# Patient Record
Sex: FEMALE | Race: WHITE | Employment: OTHER | URBAN - METROPOLITAN AREA
[De-identification: names, ages, dates, MRNs, and addresses within clinical notes are randomized per-mention and may not be internally consistent; named-entity substitution may affect disease eponyms.]

---

## 2017-09-19 ENCOUNTER — HOSPITAL ENCOUNTER (OUTPATIENT)
Dept: PHYSICAL THERAPY | Age: 42
Discharge: HOME | End: 2017-09-19
Payer: MEDICARE

## 2017-09-19 PROCEDURE — 97162 PT EVAL MOD COMPLEX 30 MIN: CPT

## 2017-09-19 PROCEDURE — 97110 THERAPEUTIC EXERCISES: CPT

## 2017-09-19 PROCEDURE — G8979 MOBILITY GOAL STATUS: HCPCS

## 2017-09-19 PROCEDURE — G8978 MOBILITY CURRENT STATUS: HCPCS

## 2017-09-19 NOTE — PROGRESS NOTES
PT DAILY TREATMENT NOTE/ EVAL 3-16    Patient Name: Jorge Arredondo  Date:2017  : 1975  [x]  Patient  Verified  [place]or: [place] MED[place] / Plan: [place] MED[place]ARE PART A & B / Product Type: Medicare /    In time:1245  Out time:1:40  Total Treatment Time (min): 55  Total Timed Codes (min): 0  1:1 Treatment Time ( [address] only): 54   Visit #: 1 of 10    Treatment Area: Muscular deconditioning [R29.898]    SUBJECTIVE  Pain Level (0-10 scale): 6/10 in bilat LE with standing; 4/10 global nerve pain - feels like rubber bands snapping    Any medication changes, allergies to medications, adverse drug reactions, diagnosis change, or new procedure performed?: [] No    [x] Yes (see summary sheet for update)  Subjective functional status/changes:     PLOF: Independent - requires extra time  Limitations to PLOF: decreased walking tolerance  Current symptoms/Complaints: global pain both neurological and muscle, decreased activity tolerance, balance associated with weak core  Previous Treatment/Compliance: Pt hospitalized for over 1 month during the end of May and July-Aug.   PMHx/Surgical Hx: Behcet's, Meniere's, Peripheral Neuropathy  Living Situation: Lives with parents  Pt Goals: Get stronger    OBJECTIVE/EXAMINATION    45 min [x]Eval                  []Re-Eval       10 min Therapeutic Exercise:  [x] See HEP in chart    Rationale: increase strength to improve the patients ability to perform ADLs. With   [x] TE   [] TA   [] neuro   [] other: Patient Education: [x] Review HEP    [] Progressed/Changed HEP based on:   [] positioning   [] body mechanics   [] transfers   [] heat/ice application    [] other:        Physical Therapy Evaluation     Posture: [] Poor    [x] Fair    [] Good    Describe:    Increased thoracic kyphosis, forward head, knees locked in hyperextension  Gait: [] Normal    [] Abnormal    Device:      Describe:    ROM:                            Shoulders:   AROM: WFL Bilat    PROM: WNL Bilat Knee:          AROM: WNL Bilat    PROM: WNL Bilat             Hip:              AROM: WFL Bilat                              PROM: WNL Bilat      Strength (MMT):  Shoulder L (1-5) R (1-5)   Shoulder Flexion 4 4   Shoulder Ext     Shoulder ABD 4 4   Shoulder ADD     Shoulder IR 4 4   Shoulder ER 3+ 3+                                             Hip L (1-5) R (1-5)   Hip Flexion 2+ 3-   Hip Ext     Hip ABD     Hip ADD     Hip ER     Hip IR       Knee L (1-5) R (1-5)   Knee Flexion 4 4+   Knee Extension 4+ 5   Ankle PF 4- 5   Ankle DF 4- 4+   Other       Tone: normal     Sensation: peripheral neuropathy    Reflexes: [x] Not Tested   Left Right   Biceps (C5)     Brachioradiais (C6)     Triceps (C7)     Knee Jerk (L4)     Ankle Jerk (SI)       Balance/ Equilibrium:        Standing Balance: Static:   [x] Good    [] Fair    [] Poor     Dynamic:   [] Good    [x] Fair-    [] Poor        Protective Extension:  [] Present    [] Delayed    [x] Absent        Single Leg Stance:         Eyes Open  Eyes Closed   L 1 L NT   R 5 R NT       Functional Mobility      Bed Mobility:         Sit-Supine: Uses UE to assist to sitting for supine; difficulty controlling descent with supine>sit      Transfers:       Sit-Stand: uses bilat UE to assist with standing and to control descent      Gait: WFL with 1 stumble with turn, mild path deviation to the L       Tandem: increased time some stumble, increased trunk sway, deviates to L          Cognition: [] One Step Commands   [x] Multiple Commands (however requires some repeating on occasion)   [] Displays Neglect [] R  [] L    Other test /comments: decreased eccentric strength to control descent with sitting, uses hands to assist with standing, difficulty with controlling upper body recline to supine, some pelvic floor weakness as noted by some incontinence, bridge ~ 20-25% indicating hip extensor weakness; some pain on L shoulder with end range flexion, uses UE to pull to sit from supine    When pt has Behcet's flare up - ulcers from head to toe, inflammation behind the eye, neuritis behind eye, joint and body pain, nausea, fatigue    Subjective motor control problems with movmeent for example trunk rotation. Pt persents with limited AROM. Coordination deficits such writing       Pain Level (0-10 scale) post treatment: 5/10    ASSESSMENT/Changes in Function: Pt is 40 yo female who presents to the clinic with significant deconditioning and loss of endurance secondary to prolonged hospitalization from illness. Pt also has both musculoskeletal pain associated with deconditioning as well as neurologic pain from peripheral neuropathy and residual affects for Behcet's disease. Pt presents with significant weakness affecting all extremities and core/hip resulting increased difficulty performing ADLs, functional transfers, and gait. Pt also has PMH significant for Meniere's disease, which combined with peripheral neuropathy and current deconditioning result in impaired balance such that pt has difficulty reaching forward or towards the ground. Patient will continue to benefit from skilled PT services to modify and progress therapeutic interventions, address functional mobility deficits, address strength deficits, analyze and cue movement patterns, analyze and modify body mechanics/ergonomics and assess and modify postural abnormalities to attain remaining goals.      [x]  See Plan of Care  []  See progress note/recertification  []  See Discharge Summary         Progress towards goals / Updated goals:  See plan of care    PLAN  []  Upgrade activities as tolerated     []  Continue plan of care  []  Update interventions per flow sheet       []  Discharge due to:_  []  Other:_      [facility], PT, DPT  9/19/2017

## 2017-09-20 NOTE — PROGRESS NOTES
[address] [facility] AT [facility]  [address] [place] [address],[address], Renetta Cade  Phone [phone]  Fax [phone] / [facility] PHYSICAL THERAP[facility] SERVICES  Patient Name: Luann Lopez : 1975   Medical   Diagnosis: Muscular deconditioning [R29.898] Treatment Diagnosis: Generalized Weakness/Deconditioning   Onset Date: Over the summer: End of May/ July-Aug     Referral Source: Sergio Krishna MD [facility]): 2017   Prior Hospitalization: See medical history Provider #: [id number]   Prior Level of Function: Independent   Comorbidities: Behcet's, Meniere's, Depression/Bi Polar, Hypothyroid, deat rt ear, Scoliosis   Medications: Verified on Patient Summary List   The Plan of Care and following information is based on the information from the initial evaluation.   =================================================================================  Assessment / key information:  Strength (MMT):  Shoulder L (1-5) R (1-5)   Shoulder Flexion 4 4   Shoulder Ext       Shoulder ABD 4 4   Shoulder ADD       Shoulder IR 4 4   Shoulder ER 3+ 3+                                             Hip L (1-5) R (1-5)   Hip Flexion 2+ 3-   Hip Ext       Hip ABD       Hip ADD       Hip ER       Hip IR          Knee L (1-5) R (1-5)   Knee Flexion 4 4+   Knee Extension 4+ 5   Ankle PF 4- 5   Ankle DF 4- 4+     Standing Balance: Static:             [x] Good    [] Fair    [] Poor                                              Dynamic:                   [] Good    [x] Fair-    [] Poor  Functional Mobility      Bed Mobility:                          Sit-Supine: Uses UE to assist to sitting for supine; difficulty controlling descent with supine>sit      Transfers:                        Sit-Stand: uses bilat UE to assist with standing and to control descent      Gait: WFL with 1 stumble with turn, mild path deviation to the L Tandem: increased time some stumble, increased trunk sway, deviates to L    Pt is 40 yo female who presents to the clinic with significant deconditioning and loss of endurance secondary to prolonged hospitalization from illness. Pt also has both musculoskeletal pain associated with deconditioning as well as neurologic pain from peripheral neuropathy and residual affects for Behcet's disease. Pt presents with significant weakness affecting all extremities and core/hip resulting increased difficulty performing ADLs, functional transfers, and gait.  Pt also has PMH significant for Meniere's disease, which combined with peripheral neuropathy and current deconditioning result in impaired balance such that pt has difficulty reaching forward or towards the ground.     =================================================================================  Eval Complexity: History: HIGH Complexity :3+ comorbidities / personal factors will impact the outcome/ POC Exam:HIGH Complexity : 4+ Standardized tests and measures addressing body structure, function, activity limitation and / or participation in recreation  Presentation: MEDIUM Complexity : Evolving with changing characteristics  Clinical Decision Making:MEDIUM Complexity : FOTO score of 26-74Overall Complexity:MEDIUM  Problem List: pain affecting function, decrease strength, impaired gait/ balance, decrease ADL/ functional abilitiies and decrease activity tolerance   Treatment Plan may include any combination of the following: Therapeutic exercise, Therapeutic activities, Neuromuscular re-education, Physical agent/modality, Gait/balance training, Manual therapy, Patient education and Functional mobility training  Patient / Family readiness to learn indicated by: asking questions and interest  Persons(s) to be included in education: patient (P)  Barriers to Learning/Limitations: None  Measures taken:     Patient Goal (s): Get stronger   Patient self reported health status: fair  Rehabilitation Potential: good   Short Term Goals: To be accomplished in  3  treatments:  1. Patient will be independent with HEP for strengthening and increased activity tolerance.  Long Term Goals: To be accomplished in  10  treatments:  2. Patient will increase strength to 5/5 to increase activity tolerance and ability to perform ADLs. 3.  Patient will improve dynamic standing balance to good to increase pt's ability to reach forward and down as needed for ADLs without fear of falling. 4. Patient will report FOTO score of 57/100 to indicate improved functional mobility. Frequency / Duration:   Patient to be seen  2-3  times per week for 10  treatments:  Patient / Caregiver education and instruction: exercises  G-Codes (GP): Mobility [id number] Current  CL= 60-79%   Goal  CK= 40-59%. The severity rating is based on the Level of Assistance required for Functional Mobility and ADLs. Therapist Signature: Jeramy Greene Date: 3/09/7414   Certification Period: 9/19/2017 - 12/18/2017 Time: 9:01 PM   ===========================================================================================  I certify that the above Physical Therapy Services are being furnished while the patient is under my care. I agree with the treatment plan and certify that this therapy is necessary. Physician Signature:        Date:       Time:     Please sign and return to [facility] or you may fax the signed copy to [phone]. Thank you.

## 2017-09-21 ENCOUNTER — APPOINTMENT (OUTPATIENT)
Dept: PHYSICAL THERAPY | Age: 42
End: 2017-09-21
Payer: MEDICARE

## 2017-09-25 ENCOUNTER — APPOINTMENT (OUTPATIENT)
Dept: PHYSICAL THERAPY | Age: 42
End: 2017-09-25
Payer: MEDICARE

## 2017-09-27 ENCOUNTER — APPOINTMENT (OUTPATIENT)
Dept: PHYSICAL THERAPY | Age: 42
End: 2017-09-27
Payer: MEDICARE

## 2017-10-03 ENCOUNTER — HOSPITAL ENCOUNTER (OUTPATIENT)
Dept: PHYSICAL THERAPY | Age: 42
Discharge: HOME | End: 2017-10-03
Payer: MEDICARE

## 2017-10-03 PROCEDURE — 97110 THERAPEUTIC EXERCISES: CPT

## 2017-10-03 NOTE — PROGRESS NOTES
PT DAILY TREATMENT NOTE     Patient Name: Radha Ross  Date:10/3/2017  : 1975  [x]  Patient  Verified  Payor: [place] MEDICARE / Plan: [place] MEDICARE PART A & B / Product Type: Medicare /    In time: 10:05  Out time:11:07  Total Treatment Time (min): 62  Visit #: 2 of     Treatment Area: Muscular deconditioning [R29.898]    SUBJECTIVE  Pain Level IN: (0-10 scale): 2/10   Pain Level OUT: (0-10 scale) post treatment: 2/10    Any medication changes, allergies to medications, adverse drug reactions, diagnosis change, or new procedure performed?: [x] No    [] Yes (see summary sheet for update)  Subjective functional status/changes:   [] No changes reported  I am doing okay, I know that I am weak all over from my shoulders to my back and in my legs    OBJECTIVE    Modality rationale: decrease pain, increase tissue extensibility and increase muscle contraction/control to improve the patients ability to perform normal ADLs without increase pain or assistance   Min Type Additional Details    [] Estim:  []Unatt       []IFC  []Premod                        []Other:  []w/ice   []w/heat  Position:  Location:    [] Estim: []Att    []TENS instruct  []NMES                    []Other:  []w/US   []w/ice   []w/heat  Position:  Location:    []  Traction: [] Cervical       []Lumbar                       [] Prone          []Supine                       []Intermittent   []Continuous Lbs:  [] before manual  [] after manual    []  Ultrasound: []Continuous   [] Pulsed                           []1MHz   []3MHz W/cm2:  Location:    []  Iontophoresis with dexamethasone         Location: [] Take home patch   [] In clinic    []  Ice     []  heat  []  Ice massage  []  Laser   []  Anodyne Position:  Location:    []  Laser with stim  []  Other:  Position:  Location:    []  Vasopneumatic Device Pressure:       [] lo [] med [] hi   Temperature: [] lo [] med [] hi   [] Skin assessment post-treatment:  []intact []redness- no adverse reaction    []redness  adverse reaction:       62/57 min Therapeutic Exercise:  [x] See flow sheet : first follow up visit since initial evaluation - initiated POC per flow sheet    Rationale: increase ROM, increase strength, improve coordination, improve balance and increase proprioception to improve the patients ability to achieve all goals stated below       With   [] TE   [] TA   [] neuro   [] other: Patient Education: [x] Review HEP    [] Progressed/Changed HEP based on:   [] positioning   [] body mechanics   [] transfers   [] heat/ice application    [] other:      Other Objective/Functional Measures: first follow up visit since initial evaluation - initiated POC per flow sheet        Patient was challenged with prone letter - required assistance with \"Y\". Patient demonstrated weakness with mod planks in prone, glut bridges and HS curls with SB - required assistance to perform exercises - patient was fatigued at end of therapy - however no increase of pain - demonstrated good willingness with program initiated today         ASSESSMENT/Changes in Function:     Patient will continue to benefit from skilled PT services to modify and progress therapeutic interventions, address functional mobility deficits, address ROM deficits, address strength deficits, analyze and cue movement patterns, analyze and modify body mechanics/ergonomics, assess and modify postural abnormalities and instruct in home and community integration to attain remaining goals. [x]  See Plan of Care  []  See progress note/recertification  []  See Discharge Summary         Progress towards goals / Updated goals: · Short Term Goals: To be accomplished in  3  treatments:  1. Patient will be independent with HEP for strengthening and increased activity tolerance. · Long Term Goals: To be accomplished in  10  treatments:  2. Patient will increase strength to 5/5 to increase activity tolerance and ability to perform ADLs.   3.  Patient will improve dynamic standing balance to good to increase pt's ability to reach forward and down as needed for ADLs without fear of falling. 4. Patient will report FOTO score of 57/100 to indicate improved functional mobility.     PLAN  [x]  Upgrade activities as tolerated     [x]  Continue plan of care  []  Update interventions per flow sheet       []  Discharge due to:_  []  Other:_      Sarah Beth Mercado PTA 10/3/2017  10:14 AM    Future Appointments  Date Time Provider Adriane Sánchez   10/6/2017 10:00 AM Sarah Beth Mercado PTA [facility]

## 2017-10-06 ENCOUNTER — APPOINTMENT (OUTPATIENT)
Dept: PHYSICAL THERAPY | Age: 42
End: 2017-10-06
Payer: MEDICARE

## 2017-10-09 ENCOUNTER — HOSPITAL ENCOUNTER (OUTPATIENT)
Dept: PHYSICAL THERAPY | Age: 42
Discharge: HOME | End: 2017-10-09
Payer: MEDICARE

## 2017-10-09 PROCEDURE — 97110 THERAPEUTIC EXERCISES: CPT

## 2017-10-09 NOTE — PROGRESS NOTES
PT DAILY TREATMENT NOTE     Patient Name: Flako Duncan  Date:10/9/2017  : 1975  [x]  Patient  Verified  Payor: [place] MEDICARE / Plan: [place] MEDICARE PART A & B / Product Type: Medicare /    In time: 8:04  Out time:8:50  Total Treatment Time (min): 55  Visit #: 3 of     Treatment Area: Muscular deconditioning [R29.898]    SUBJECTIVE  Pain Level IN: (0-10 scale): 3/10   Pain Level OUT: (0-10 scale) post treatment: 3/10    Any medication changes, allergies to medications, adverse drug reactions, diagnosis change, or new procedure performed?: [x] No    [] Yes (see summary sheet for update)  Subjective functional status/changes:   [] No changes reported  Having an autoimmune flare up and has some ulcers.      OBJECTIVE    Modality rationale: decrease pain, increase tissue extensibility and increase muscle contraction/control to improve the patients ability to perform normal ADLs without increase pain or assistance   Min Type Additional Details    [] Estim:  []Unatt       []IFC  []Premod                        []Other:  []w/ice   []w/heat  Position:  Location:    [] Estim: []Att    []TENS instruct  []NMES                    []Other:  []w/US   []w/ice   []w/heat  Position:  Location:    []  Traction: [] Cervical       []Lumbar                       [] Prone          []Supine                       []Intermittent   []Continuous Lbs:  [] before manual  [] after manual    []  Ultrasound: []Continuous   [] Pulsed                           []1MHz   []3MHz W/cm2:  Location:    []  Iontophoresis with dexamethasone         Location: [] Take home patch   [] In clinic    []  Ice     []  heat  []  Ice massage  []  Laser   []  Anodyne Position:  Location:    []  Laser with stim  []  Other:  Position:  Location:    []  Vasopneumatic Device Pressure:       [] lo [] med [] hi   Temperature: [] lo [] med [] hi   [] Skin assessment post-treatment:  []intact []redness- no adverse reaction    []redness  adverse reaction: 46/30 min Therapeutic Exercise:  [x] See flow sheet : first follow up visit since initial evaluation - initiated POC per flow sheet    Rationale: increase ROM, increase strength, improve coordination, improve balance and increase proprioception to improve the patients ability to achieve all goals stated below       With   [] TE   [] TA   [] neuro   [] other: Patient Education: [x] Review HEP    [] Progressed/Changed HEP based on:   [] positioning   [] body mechanics   [] transfers   [] heat/ice application    [] other:      Other Objective/Functional Measures: first follow up visit since initial evaluation - initiated POC per flow sheet        Challenged stabilizing L/S with bridges with OSB. Weakness noted in scapular ms and Teres & infraspinatus and LT and required assistance to perform Ys. Improved form and control with mini squats. ASSESSMENT/Changes in Function:     Patient will continue to benefit from skilled PT services to modify and progress therapeutic interventions, address functional mobility deficits, address ROM deficits, address strength deficits, analyze and cue movement patterns, analyze and modify body mechanics/ergonomics, assess and modify postural abnormalities and instruct in home and community integration to attain remaining goals. [x]  See Plan of Care  []  See progress note/recertification  []  See Discharge Summary         Progress towards goals / Updated goals: · Short Term Goals: To be accomplished in  3  treatments:  1. Patient will be independent with HEP for strengthening and increased activity tolerance. · Long Term Goals: To be accomplished in  10  treatments:  2. Patient will increase strength to 5/5 to increase activity tolerance and ability to perform ADLs. 3.  Patient will improve dynamic standing balance to good to increase pt's ability to reach forward and down as needed for ADLs without fear of falling.   4. Patient will report FOTO score of 57/100 to indicate improved functional mobility.     PLAN  [x]  Upgrade activities as tolerated     [x]  Continue plan of care  []  Update interventions per flow sheet       []  Discharge due to:_  []  Other:_      Sajan Frazieryvette 10/9/2017  10:14 AM    Future Appointments  Date Time Provider Adriane Sánchez   10/9/2017 8:00 AM [facility]   10/11/2017 7:30 AM Pascual Blankenship, PTA [facility]   10/16/2017 10:00 AM [facility] PT [facility]   10/18/2017 11:00 AM [facility] PT [facility]

## 2017-10-11 ENCOUNTER — APPOINTMENT (OUTPATIENT)
Dept: PHYSICAL THERAPY | Age: 42
End: 2017-10-11
Payer: MEDICARE

## 2017-10-13 ENCOUNTER — HOSPITAL ENCOUNTER (OUTPATIENT)
Dept: PHYSICAL THERAPY | Age: 42
End: 2017-10-13
Payer: MEDICARE

## 2017-10-16 ENCOUNTER — APPOINTMENT (OUTPATIENT)
Dept: PHYSICAL THERAPY | Age: 42
End: 2017-10-16
Payer: MEDICARE

## 2017-10-17 ENCOUNTER — APPOINTMENT (OUTPATIENT)
Dept: PHYSICAL THERAPY | Age: 42
End: 2017-10-17
Payer: MEDICARE

## 2017-10-18 ENCOUNTER — APPOINTMENT (OUTPATIENT)
Dept: PHYSICAL THERAPY | Age: 42
End: 2017-10-18
Payer: MEDICARE

## 2017-10-26 ENCOUNTER — HOSPITAL ENCOUNTER (OUTPATIENT)
Dept: PHYSICAL THERAPY | Age: 42
Discharge: HOME | End: 2017-10-26
Payer: MEDICARE

## 2017-10-26 PROCEDURE — G8978 MOBILITY CURRENT STATUS: HCPCS

## 2017-10-26 PROCEDURE — 97110 THERAPEUTIC EXERCISES: CPT

## 2017-10-26 PROCEDURE — G8980 MOBILITY D/C STATUS: HCPCS

## 2017-10-26 PROCEDURE — G8979 MOBILITY GOAL STATUS: HCPCS

## 2017-10-26 NOTE — PROGRESS NOTES
PT DAILY TREATMENT NOTE     Patient Name: Camille Duran  Date:10/26/2017  : 1975  [x]  Patient  Verified  Payor: Eleno Leon / Plan: [place] PART [place] & B / Product Type: Medicare /    In time: 2:33  Out time: 3:31  Total Treatment Time (min): 58  Total Timed Codes: 58  1:1 Treatment Time: 55  Visit #: 4 of     Treatment Area: Muscular deconditioning [R29.898]    SUBJECTIVE  Pain Level IN: (0-10 scale): 2/10 R Foot and back  Pain Level OUT: (0-10 scale) post treatment: 2/10    Any medication changes, allergies to medications, adverse drug reactions, diagnosis change, or new procedure performed?: [x] No    [] Yes (see summary sheet for update)  Subjective functional status/changes:   [] No changes reported  Pt reports back pain due to core weakness    OBJECTIVE    Modality rationale: decrease pain, increase tissue extensibility and increase muscle contraction/control to improve the patients ability to perform normal ADLs without increase pain or assistance   Min Type Additional Details    [] Estim:  []Unatt       []IFC  []Premod                        []Other:  []w/ice   []w/heat  Position:  Location:    [] Estim: []Att    []TENS instruct  []NMES                    []Other:  []w/US   []w/ice   []w/heat  Position:  Location:    []  Traction: [] Cervical       []Lumbar                       [] Prone          []Supine                       []Intermittent   []Continuous Lbs:  [] before manual  [] after manual    []  Ultrasound: []Continuous   [] Pulsed                           []1MHz   []3MHz W/cm2:  Location:    []  Iontophoresis with dexamethasone         Location: [] Take home patch   [] In clinic    []  Ice     []  heat  []  Ice massage  []  Laser   []  Anodyne Position:  Location:    []  Laser with stim  []  Other:  Position:  Location:    []  Vasopneumatic Device Pressure:       [] lo [] med [] hi   Temperature: [] lo [] med [] hi   [] Skin assessment post-treatment:  []intact []redness- no adverse reaction    []redness  adverse reaction:       58 (45 1:1) min Therapeutic Exercise:  [x] See flow sheet :    Rationale: increase ROM, increase strength, improve coordination, improve balance and increase proprioception to improve the patients ability to achieve all goals stated below       With   [] TE   [] TA   [] neuro   [] other: Patient Education: [x] Review HEP    [] Progressed/Changed HEP based on:   [] positioning   [] body mechanics   [] transfers   [] heat/ice application    [] other:      Other Objective/Functional Measures: FOTO: 34/100 (-2)  Pt is able to perform sit to stand without use of UE from 21.5 in surface  MMT: Knee Flex: bilat 5/5             Knee Ext: bilat 4+/5             Hip Ext:              Hip Flex (supine): bilat 4/5               Shoulder Flexion: bilat 4/5             Shoulder Abd: bilat 4/5             Periscapular strength: UT substitution with T's and unable to do Y's. SLS: R - 20 sec: knee turns medially (+15sec)           L - 12 sec: knee turns medially (+11 sec)    Pt reports overall improvement in leg strength. ASSESSMENT/Changes in Function: Pt demonstrates improvement in LE strength as noted above, however pt continues to present with core, bilat shoulder and periscapular musculature weakness. Pt demo's UT substitution when performing prone Ts. Pt unable to perform Ys. Pt reports the most difficulty getting up off the ground and standing long enough to get ready. Pt reports the most difficulty getting up off the ground and standing long enough to get ready. Pt would benefit from continued therapy to improve overall strength and endurance so as to improve balance and functional mobility.      Patient will continue to benefit from skilled PT services to modify and progress therapeutic interventions, address functional mobility deficits, address ROM deficits, address strength deficits, analyze and cue movement patterns, analyze and modify body mechanics/ergonomics, assess and modify postural abnormalities and instruct in home and community integration to attain remaining goals. [x]  See Plan of Care  []  See progress note/recertification  []  See Discharge Summary         Progress towards goals / Updated goals: · Short Term Goals: To be accomplished in  3  treatments:  1. Patient will be independent with HEP for strengthening and increased activity tolerance. MET 10/26/2017 - needs to be progressed in difficulty and to incorporate UE/Shoulders  · Long Term Goals: To be accomplished in  10  treatments:  2. Patient will increase strength to 5/5 to increase activity tolerance and ability to perform ADLs. Progressing 10/26/2017  3. Patient will improve dynamic standing balance to good to increase pt's ability to reach forward and down as needed for ADLs without fear of falling. Progressing 10/26/2017  4. Patient will report FOTO score of 57/100 to indicate improved functional mobility.  Progressing 10/26/2017 (34/100)    PLAN  [x]  Upgrade activities as tolerated     [x]  Continue plan of care  []  Update interventions per flow sheet       []  Discharge due to:_  []  Other:_      Jayme Santos, PT, DPT  10/26/2017

## 2017-10-26 NOTE — PROGRESS NOTES
[address] [facility] AT [facility]  [address] [place] [address],[address], [place], [place]  Phone [phone]  Fax [phone]  PROGRESS NOTE  Patient Name: Margie Alejandra : 1975   Treatment/Medical Diagnosis: Muscular deconditioning [R29.898]   Referral Source: Gemini Harrison MD     Date of Initial Visit: 2017 Attended Visits: 4 Missed Visits: 10     SUMMARY OF TREATMENT  Pt seen for total of 4 visits for deconditioning. Therapy consisted of therapeutic exercise to improve strength and balance. CURRENT STATUS  FOTO: 34/100 (-2)  Pt is able to perform sit to stand without use of UE from 21.5 in surface  MMT: Knee Flex: bilat 5/5             Knee Ext: bilat 4+/5             Hip Ext:              Hip Flex (supine): bilat 4/5               Shoulder Flexion: bilat 4/5             Shoulder Abd: bilat 4/5             Periscapular strength: UT substitution with T's and unable to do Y's. SLS: R - 20 sec: knee turns medially (+15sec)           L - 12 sec: knee turns medially (+11 sec)     Pt reports overall improvement in leg strength.      ASSESSMENT/Changes in Function: Pt demonstrates improvement in LE strength as noted above, however pt continues to present with core, bilat shoulder and periscapular musculature weakness. Pt demo's UT substitution when performing prone Ts. Pt unable to perform Ys. Pt reports the most difficulty getting up off the ground and standing long enough to get ready. Pt reports the most difficulty getting up off the ground and standing long enough to get ready. Pt would benefit from continued therapy to improve overall strength and endurance so as to improve balance and functional mobility. Goal/Measure of Progress Goal Met? · Short Term Goals: To be accomplished in  3  treatments:  1.  Patient will be independent with HEP for strengthening and increased activity tolerance. MET 10/26/2017 - needs to be progressed in difficulty and to incorporate UE/Shoulders  · Long Term Goals: To be accomplished in  10  treatments:  2.  Patient will increase strength to 5/5 to increase activity tolerance and ability to perform ADLs. Progressing 10/26/2017  3.  Patient will improve dynamic standing balance to good to increase pt's ability to reach forward and down as needed for ADLs without fear of falling. Progressing 10/26/2017    4. Patient will report FOTO score of 57/100 to indicate improved functional mobility. Progressing 10/26/2017 (34/100)  New Goals to be achieved in __8-12__  treatments:  Continue LTG's  G-Codes: Mobility  Current  CJ= 20-39%   Goal  CK= 40-59%. The severity rating is based on the Level of Assistance required for Functional Mobility and ADLs. RECOMMENDATIONS  Continue therapy 2x week for 8-12 treatment to continue working towards remaining goals. If you have any questions/comments please contact us directly at [phone]. Thank you for allowing us to assist in the care of your patient. Therapist Signature: Ariel Taylor Date: 10/26/2017     Time: 4:00 PM   NOTE TO PHYSICIAN:  PLEASE COMPLETE THE ORDERS BELOW AND FAX TO   [facility]: [phone]. If you are unable to process this request in 24 hours please contact our office: [phone].    ___ I have read the above report and request that my patient continue as recommended.   ___ I have read the above report and request that my patient continue therapy with the following changes/special instructions:_________________________________________________________   ___ I have read the above report and request that my patient be discharged from therapy.      Physician Signature:        Date:       Time:

## 2017-12-27 NOTE — PROGRESS NOTES
[address] [facility] AT [facility]  [address], [place] [address]  Phone [phone]  Fax [phone]  DISCHARGE SUMMARY FOR PHYSICAL THERAPY          Patient Name: José Cary : 1975   Treatment/Medical Diagnosis: Muscular deconditioning [R29.898]   Onset Date: Over the summer; End of May/Jul-Aug    Referral Source: Rodrigo Syed MD [facility]): 17   Prior Hospitalization: See Medical History Provider #: [id number]   Prior Level of Function: Independent   Comorbidities: Behcet's, Meniere's, Depression/Bi Polar, Hypothyroid, Deaf Rt. Ear, Scoliosis   Medications: Verified on Patient Summary List   Visits from [facility]: 4 Missed Visits: 9       Goal/Measure of Progress Goal Met? · Short Term Goals: To be accomplished in  3  treatments:  1.  Patient will be independent with HEP for strengthening and increased activity tolerance. MET 10/26/2017 - needs to be progressed in difficulty and to incorporate UE/Shoulders  · Long Term Goals: To be accomplished in  10  treatments:  2.  Patient will increase strength to 5/5 to increase activity tolerance and ability to perform ADLs. Progressing 10/26/2017  3.  Patient will improve dynamic standing balance to good to increase pt's ability to reach forward and down as needed for ADLs without fear of falling. Progressing 10/26/2017     4. Patient will report FOTO score of 57/100 to indicate improved functional mobility. Progressing 10/26/2017 (34/100)    Key Functional Changes/Progress: Pt did not return to clinic. G-Codes (GP): Mobility  [id number] Goal  CK= 40-59% [id number] D/C  CJ= 20-39%. The severity rating is based on the Level of Assistance required for Functional Mobility and ADLs. Assessments/Recommendations: Discontinue therapy due to lack of attendance or compliance. If you have any questions/comments please contact us directly at [phone].    Thank you for allowing us to assist in the care of your patient.     Therapist Signature: Winnie Garcia Date: 12/27/17   Reporting Period: 9/19/17-12/18/17 Time: 3:46 PM

## 2019-01-16 ENCOUNTER — HOSPITAL ENCOUNTER (OUTPATIENT)
Dept: PHYSICAL THERAPY | Age: 44
Discharge: HOME | End: 2019-01-16
Payer: MEDICARE

## 2019-01-16 PROCEDURE — 97162 PT EVAL MOD COMPLEX 30 MIN: CPT

## 2019-01-16 PROCEDURE — 97110 THERAPEUTIC EXERCISES: CPT

## 2019-01-16 NOTE — PROGRESS NOTES
[address] [facility] AT [address] [place] [address] [address], [place], [address] [place] [address] Road  Phone: [phone]   [phone] PLAN OF CARE / STATEMENT OF MEDICAL NECESSITY FOR PHYSICAL THERAPY SERVICES Patient Name: Vipin Smith : 1975 Medical  
Diagnosis: Neck pain [M54.2] Treatment Diagnosis: Neck pain [M54.2] Onset Date:  Referral Source: Theresa Padilla* Start of Care [facility]): 2019 Prior Hospitalization: See medical history Provider #: [id number] Prior Level of Function: Independent w/ ADL's Comorbidities: Behcet's disease, Medications: Verified on Patient Summary List  
The Plan of Care and following information is based on the information from the initial evaluation.  
=========================================================================================== Assessment / key information:  Patient is a 38 y/o female presenting for evaluation and treatment of the neck and upper extremities; onset beginning in approximately 2017. Recent MRI shows disc bulges in both cervical and lumbar region. Pt reports pain shooting intermittently into left upper extremity. She reports some weakness and difficulty with writing, holding/grasping things. She reports pain in the C-spine bilaterally, suboccipital region, down the left arm and toward the 4th-5th digits. Symptoms are worsened with looking up (neck), occasionally increased arm pain with running. Pt attends massage therapy periodically which she states has helped her neck. Pain intensity ranges from 0-7/10. POSTURE: Forward head, rounded shoulders AROM: WNL/hypermobility of C-spine and BL shoulders in all planes STRENGTH:  56 lbs left, 85 lbs right; Left upper extremity 4/5 throughout, 3+/5 left mid/low trap; 4-/5 on right PALPATION: Mod TTP throughout left>right suboccipitals, cervical paraspinals, UT/levator and rhomboid muscles SPECIAL TESTS: Joint hypermobility noted throughout both shoulders, wrists and spine, negative Spurling's and compression/distraction, negative Hyperabduction/TOS tests The patient was instructed in a home exercise program to address the above findings/deficits. The patient should benefit from therapy services to progress toward functional goals and improve quality of life. 
=========================================================================================== History MEDIUM  Complexity : 1-2 comorbidities / personal factors will impact the outcome/ POC ; Examination HIGH Complexity : 4+ Standardized tests and measures addressing body structure, function, activity limitation and / or participation in recreation ; Presentation HIGH Complexity : Unstable and unpredictable characteristics ; Decision Making MEDIUM Complexity : FOTO score of 26-74; Complexity MEDIUM Problem List: pain affecting function, decrease strength, decrease ADL/ functional abilitiies and decrease activity tolerance Treatment Plan may include any combination of the following: Therapeutic exercise, Therapeutic activities, Physical agent/modality, Manual therapy and Patient education Patient / Family readiness to learn indicated by: asking questions, trying to perform skills and interest 
Persons(s) to be included in education: patient (P) Barriers to Learning/Limitations: None Measures taken:   
Patient Goal (s): Reduce pain, improve strength in arm Patient self reported health status: fair Rehabilitation Potential: good ? Short Term Goals: To be accomplished in  6  treatments: Independent/compliant with long term HEP for cervical/postural stabilization Patient will be educated on sitting posture modification to reduce musculoskeletal strain with sitting ADL's Improve mid/low trap strengths to 4/5 to improve postural/scapular control with ADL's ? Long Term Goals: To be accomplished in  12  treatments: Improve FOTO outcome score by 8% to indicate a significant improvement in ADL function Improve dynamometer  strength to 65 lbs to improve daily motor tasks Frequency / Duration:   Patient to be seen  2  times per week for 6  weeks: 
Patient / Caregiver education and instruction: activity modification and exercises G-Codes (GP): ADAIR Therapist Signature: Corazon Taylor PT, OCS Date: 1/16/2019 Certification Period: 1/16/19-4/15/19 Time: 9:17 AM  
=========================================================================================== I certify that the above Physical Therapy Services are being furnished while the patient is under my care. I agree with the treatment plan and certify that this therapy is necessary. Physician Signature:       Date:      Time:  Please sign and return to [facility] or you may fax the signed copy to [phone]. Thank you.

## 2019-01-16 NOTE — PROGRESS NOTES
PT DAILY TREATMENT Patient Name: Jorge Arredondo Date:2019 : 1975 [x]  Patient  Verified Payor: [place] MEDICARE / Plan: [address] / Product Type: Medicare / In time:9:25  Out time:10:30 Total Treatment Time (min): 65 Total Timed Codes (min): 20 
1:1 Treatment Time ( only): 65 Visit #: 1 of 12 Treatment Area: Neck pain [M54.2] SUBJECTIVE Pain Level (0-10 scale): 3 See Plan of Care for subjective history and objective cervical/lumbar exam details OBJECTIVE Modality (rationale):  
[]  E-Stim: type _ x _ min     []att   []unatt   []w/US   []w/ice   []w/heat 
[]  Traction: []cerv   []pelvic   _ lbs x _ min     []pro   []sup   []int   []const 
[]  Ultrasound: []cont   []pulse    _ W/cm2 x _  min   []1MHz   []3MHz 
[]  Iontophoresis: []take home patch w/ dexamethazone    []40mA   []80mA 
                             []_ mA min w/: []dexamethazone   []other:_ 
[]  Ice pack _  min     [] Hot pack _  min     [] Paraffin _  min 
[]  Other:  
 
Therapeutic Exercise: [x] see flow sheet     [] Other:_ (minutes) : 20 Added/Changed Exercises: 
[x]  Added:See HEP to improve (function): ability to change and maintain head and shoulder positions []  Changed:_ to improve (function): Therapeutic Activity:      (minutes) : 
 
Manual Therapy:       (minutes) : 
 
Patient Education: [x] Review HEP    [] Progressed/Changed HEP based on:_  
[] positioning   [] body mechanics   [] transfers   [] heat/ice application   (minutes) : 
 
Pain Level (0-10 scale) post treatment: 3 
 
ASSESSMENT [x]  See Plan of Care Patient is assigned a medium evaluation complexity based upon presence of Behcet's disease, FOTO score, and complex/evolving symptom characteristics. PLANSee Plan of Care Adriane Castañeda 2019  9:22 AM

## 2019-01-22 ENCOUNTER — HOSPITAL ENCOUNTER (OUTPATIENT)
Dept: PHYSICAL THERAPY | Age: 44
Discharge: HOME | End: 2019-01-22
Payer: MEDICARE

## 2019-01-22 PROCEDURE — 97140 MANUAL THERAPY 1/> REGIONS: CPT

## 2019-01-22 PROCEDURE — 97110 THERAPEUTIC EXERCISES: CPT

## 2019-01-22 NOTE — PROGRESS NOTES
PT DAILY TREATMENT NOTE 8-14 Patient Name: Jovanna Falcon Date:2019 : 1975 [x]  Patient  Verified Payor: [place] MEDICARE / Plan: [address] / Product Type: Medicare / In time:9:10  Out time:10:36 Total Treatment Time (min): 86 Total Timed Codes (min):70 
1:1 Treatment Time (min): 70 Visit #: 2 of 12 Treatment Area: Neck pain [M54.2] SUBJECTIVE Pain Level (0-10 scale): 3.5/10 Any medication changes, allergies to medications, adverse drug reactions, diagnosis change, or new procedure performed?: [x] No    [] Yes (see summary sheet for update) Subjective functional status/changes:   [] No changes reported I feel a lot of tension in the left side of my neck and I feel the pain and numbness down my arm to my end 3 fingers pretty much all of the time. OBJECTIVE Modality rationale: decrease pain and increase tissue extensibility to improve the patients ability to improve functional abilities Type Additional Details  
[] Estim: []Att   []Unatt  []TENS instruct []IFC  []Premod []NMES                       []Other:  []w/US   []w/ice   []w/heat Position: Location:  
[]  Traction: [] Cervical       []Lumbar 
                     [] Prone          []Supine []Intermittent   []Continuous Lbs: 
[] before manual 
[] after manual  
[]  Ultrasound: []Continuous   [] Pulsed []1MHz   []3MHz Location: 
W/cm2:  
[]  Iontophoresis with dexamethasone Location: [] Take home patch  
[] In clinic  
[]  Ice     [x]  heat 
[]  Ice massage Position:seated Location:cervical x 10 mins []  Vasopneumatic Device Pressure: [] lo [] med [] hi  
Temp: [] lo [] med [] hi  
[] Skin assessment post-treatment:  []intact []redness- no adverse reaction 
     []redness  adverse reaction:  
 
 
64 min Therapeutic Exercise:  [x] See flow sheet :  
Rationale: increase ROM and increase strength to improve the patients ability to improve functional abilities 12 min Manual Therapy:  SOR, DTM and Instrument assisted soft tissue mobilization applied to left cervical musculature using GT 1,3&6 Rationale: increase ROM, increase tissue extensibility and decrease trigger points to improve functional mobility  
       
 min Patient Education: [x] Review HEP    [] Progressed/Changed HEP based on:  
[] positioning   [] body mechanics   [] transfers   [] heat/ice application Other Objective/Functional Measures:  
 
Pain Level (0-10 scale) post treatment: 2/10 ASSESSMENT/Changes in Function: Pt tolerated all new therex well upon trial today with chief c/o left cervical tension/pain with radicular symptoms down to digits 3-5. Pt responded well to SOR and manual intervention as well as neutral biased/elongation stretching due to combination of discogenic/stenotic based pathologies. Will continue to progress/advance patient within current POC as tolerated with monitoring symptoms. Patient will continue to benefit from skilled PT services to modify and progress therapeutic interventions, address functional mobility deficits, address ROM deficits, address strength deficits, analyze and address soft tissue restrictions, analyze and cue movement patterns, analyze and modify body mechanics/ergonomics and assess and modify postural abnormalities to attain remaining goals. []  See Plan of Care 
[]  See progress note/recertification 
[]  See Discharge Summary Progress towards goals / Updated goals: PLAN [x]  Upgrade activities as tolerated     []  Continue plan of care 
[]  Update interventions per flow sheet      
[]  Discharge due to:_ 
[]  Other:_ Michelle Moreland PTA 1/22/2019  9:12 AM

## 2019-01-24 ENCOUNTER — HOSPITAL ENCOUNTER (OUTPATIENT)
Dept: PHYSICAL THERAPY | Age: 44
Discharge: HOME | End: 2019-01-24
Payer: MEDICARE

## 2019-01-24 PROCEDURE — 97140 MANUAL THERAPY 1/> REGIONS: CPT

## 2019-01-24 PROCEDURE — 97110 THERAPEUTIC EXERCISES: CPT

## 2019-01-24 NOTE — PROGRESS NOTES
PT DAILY TREATMENT NOTE 8-14 Patient Name: Damian Robertson Date:2019 : 1975 [x]  Patient  Verified Payor: [place] MED[place] / Plan: [facility] / Product Type: Medicare / In time:10:01  Out time:11:28 Total Treatment Time (min): 87 Total Timed Codes (min): 71 
1:1 Treatment Time (min): 45 Visit #: 3 of 12 Treatment Area: Neck pain [M54.2] SUBJECTIVE Pain Level (0-10 scale): 3.5/10 Any medication changes, allergies to medications, adverse drug reactions, diagnosis change, or new procedure performed?: [x] No    [] Yes (see summary sheet for update) Subjective functional status/changes:   [] No changes reported My arm has still been numb and tingly especially when I use my arm to do certain movements as well as when I am driving. OBJECTIVE Modality rationale: decrease pain and increase tissue extensibility to improve the patients ability to improve functional abilities Min Type Additional Details  
 [] Estim: []Att   []Unatt  []TENS instruct []IFC  []Premod []NMES                       []Other:  []w/US   []w/ice   []w/heat Position: Location:  
 []  Traction: [] Cervical       []Lumbar 
                     [] Prone          []Supine []Intermittent   []Continuous Lbs: 
[] before manual 
[] after manual  
 []  Ultrasound: []Continuous   [] Pulsed []1MHz   []3MHz Location: 
W/cm2:  
 []  Iontophoresis with dexamethasone Location: [] Take home patch  
[] In clinic  
 []  Ice     [x]  heat 
[]  Ice massage Position:supine Location:cervical x 10 mins  []  Vasopneumatic Device Pressure: [] lo [] med [] hi  
Temp: [] lo [] med [] hi  
[] Skin assessment post-treatment:  []intact []redness- no adverse reaction 
     []redness  adverse reaction:  
 
 
62 min Therapeutic Exercise:  [x] See flow sheet :  
Rationale: increase ROM and increase strength to improve the patients ability to improve functional abilities 15 min Manual Therapy:  Instrument assisted soft tissue mobilization applied to suboccipitals and bilateral cervical musculature using GT 1,3&6 Rationale: increase ROM, increase tissue extensibility and decrease trigger points to improve functional mobility  
       
 min Patient Education: [x] Review HEP    [] Progressed/Changed HEP based on:  
[] positioning   [] body mechanics   [] transfers   [] heat/ice application Other Objective/Functional Measures:  
 
Pain Level (0-10 scale) post treatment: 2/10 ASSESSMENT/Changes in Function: Pt reported increased benefit from focusing manual intervention to suboccipital and bilateral cervical musculature today. Pt still reports intermittent left UE radicular numbness/tingling with various UE AROM activities and prolonged driving. Will continue to progress/advance patient within current POC as tolerated with monitoring symptoms. Patient will continue to benefit from skilled PT services to modify and progress therapeutic interventions, address functional mobility deficits, address ROM deficits, address strength deficits, analyze and address soft tissue restrictions, analyze and cue movement patterns, analyze and modify body mechanics/ergonomics and assess and modify postural abnormalities to attain remaining goals. []  See Plan of Care 
[]  See progress note/recertification 
[]  See Discharge Summary Progress towards goals / Updated goals: PLAN [x]  Upgrade activities as tolerated     []  Continue plan of care 
[]  Update interventions per flow sheet      
[]  Discharge due to:_ 
[]  Other:_ Jeremy Stephenson, ALISTAIR 1/24/2019  10:09 AM

## 2019-01-30 ENCOUNTER — HOSPITAL ENCOUNTER (OUTPATIENT)
Dept: PHYSICAL THERAPY | Age: 44
Discharge: HOME | End: 2019-01-30
Payer: MEDICARE

## 2019-01-30 PROCEDURE — 97110 THERAPEUTIC EXERCISES: CPT

## 2019-01-30 PROCEDURE — 97140 MANUAL THERAPY 1/> REGIONS: CPT

## 2019-01-30 NOTE — PROGRESS NOTES
PT DAILY TREATMENT NOTE 8-14 Patient Name: Alec Shelton Date:2019 : 1975 [x]  Patient  Verified Payor: [place] MEDICARE / Plan: Jodie Charles y / Product Type: Medicare / In time:1:28  Out time:2:35 Total Treatment Time (min): 67 Total Timed Codes (min): 57 
1:1 Treatment Time (min): 57 Visit #: 4 of 12 Treatment Area: Neck pain [M54.2] SUBJECTIVE Pain Level (0-10 scale): 5 Any medication changes, allergies to medications, adverse drug reactions, diagnosis change, or new procedure performed?: [x] No    [] Yes (see summary sheet for update) Subjective functional status/changes:   [] No changes reported Pt reports increased central neck pain, especially with looking up. OBJECTIVE Modality rationale: decrease pain and increase tissue extensibility to improve the patients ability to change and maintain head and shoulder positions Type Additional Details  
[] Estim: []Att   []Unatt  []TENS instruct []IFC  []Premod []NMES                       []Other:  []w/US   []w/ice   []w/heat Position: Location:  
[]  Traction: [] Cervical       []Lumbar 
                     [] Prone          []Supine []Intermittent   []Continuous Lbs: 
[] before manual 
[] after manual  
[]  Ultrasound: []Continuous   [] Pulsed []1MHz   []3MHz Location: 
W/cm2:  
[]  Iontophoresis with dexamethasone Location: [] Take home patch  
[] In clinic  
[]  Ice     [x]  heat 
[]  Ice massage Position: Seated Location: Cervical  
[]  Vasopneumatic Device Pressure: [] lo [] med [] hi  
Temp: [] lo [] med [] hi  
[] Skin assessment post-treatment:  []intact []redness- no adverse reaction 
     []redness  adverse reaction:  
 
 
43 min Therapeutic Exercise:  [] See flow sheet :  
Rationale: increase ROM and increase strength to improve the patients ability to change and maintain head and shoulder positions 14 min Manual Therapy:  Instrument assisted soft tissue mobilization applied to left>right cervical paraspinals, UT and levator  using GT-3 and GT-4 Rationale: increase tissue extensibility to improve the patient's ability to change and maintain head and shoulder positions Pain Level (0-10 scale) post treatment: 4 
 
ASSESSMENT/Changes in Function: Patient was progressed with prone scap exercises to addition of \"T\" lifts for mid trap strengthening. Pt required cueing as with onset of fatigue there was  Gradual/noticeable drop in arm angle. Patient will continue to benefit from skilled PT services to address strength deficits, analyze and address soft tissue restrictions and analyze and cue movement patterns to attain remaining goals. []PLAN [x]  Upgrade activities as tolerated     []  Continue plan of care 
[]  Update interventions per flow sheet      
[]  Discharge due to:_ 
[]  Other:_ Sonia Rojas PT 1/30/2019  8:16 AM

## 2019-02-01 ENCOUNTER — HOSPITAL ENCOUNTER (OUTPATIENT)
Dept: PHYSICAL THERAPY | Age: 44
Discharge: HOME | End: 2019-02-01
Payer: MEDICARE

## 2019-02-01 PROCEDURE — 97140 MANUAL THERAPY 1/> REGIONS: CPT

## 2019-02-01 PROCEDURE — 97110 THERAPEUTIC EXERCISES: CPT

## 2019-02-01 NOTE — PROGRESS NOTES
PT DAILY TREATMENT NOTE 8 Patient Name: Ja Nye Date:2019 : 1975 [x]  Patient  Verified Payor: [place] MEDICARE / Plan: Jodie Charles [facility] / Product Type: Medicare / In time:7:00  Out time:8:26 Total Treatment Time (min): 86 Total Timed Codes (min): 70 
1:1 Treatment Time (min): 70 Visit #: 5 of 12 Treatment Area: Neck pain [M54.2] SUBJECTIVE Pain Level (0-10 scale): 4/10 Any medication changes, allergies to medications, adverse drug reactions, diagnosis change, or new procedure performed?: [x] No    [] Yes (see summary sheet for update) Subjective functional status/changes:   [] No changes reported My neck usually hurts in the morning the most and it wakes me up sometimes during the night, but I usually try to sleep on my back and end up on my side sometimes. OBJECTIVE Modality rationale: decrease pain and increase tissue extensibility to improve the patients ability to improve functional abilities Type Additional Details  
[] Estim: []Att   []Unatt  []TENS instruct []IFC  []Premod []NMES                       []Other:  []w/US   []w/ice   []w/heat Position: Location:  
[]  Traction: [] Cervical       []Lumbar 
                     [] Prone          []Supine []Intermittent   []Continuous Lbs: 
[] before manual 
[] after manual  
[]  Ultrasound: []Continuous   [] Pulsed []1MHz   []3MHz Location: 
W/cm2:  
[]  Iontophoresis with dexamethasone Location: [] Take home patch  
[] In clinic  
[]  Ice     [x]  heat 
[]  Ice massage Position:seated Location:cervical x 10 minutes  
[]  Vasopneumatic Device Pressure: [] lo [] med [] hi  
Temp: [] lo [] med [] hi  
[] Skin assessment post-treatment:  []intact []redness- no adverse reaction 
     []redness  adverse reaction:  
 
 
61 min Therapeutic Exercise:  [x] See flow sheet :  
 Rationale: increase ROM and increase strength to improve the patients ability to improve functional abilities 15 min Manual Therapy:  Instrument assisted soft tissue mobilization applied to suboccipitals and bilateral cervical musculature using GT 1,3&4 including pin and glides to right upper trap Rationale: increase ROM, increase tissue extensibility and decrease trigger points to improve functional mobility  
       
 min Patient Education: [x] Review HEP    [] Progressed/Changed HEP based on:  
[] positioning   [] body mechanics   [] transfers   [] heat/ice application Other Objective/Functional Measures:  
 
Pain Level (0-10 scale) post treatment: 3/10 ASSESSMENT/Changes in Function: Pt was able to advance to addition of theraband lower trap walkouts as well as increasing to 1 lb with prone I PRE's with min to mod challenge today. Pt also reported increased benefit from trial with pin and glides to right upper trap with manual intervention today. Will continue to progress/advance patient within current POC as tolerated with monitoring symptoms. Patient will continue to benefit from skilled PT services to modify and progress therapeutic interventions, address functional mobility deficits, address ROM deficits, address strength deficits, analyze and address soft tissue restrictions, analyze and cue movement patterns, analyze and modify body mechanics/ergonomics and assess and modify postural abnormalities to attain remaining goals. []  See Plan of Care 
[]  See progress note/recertification 
[]  See Discharge Summary Progress towards goals / Updated goals: STG #2=Patient will be educated on sitting posture modification to reduce musculoskeletal strain with sitting ADL's=Met PLAN [x]  Upgrade activities as tolerated     []  Continue plan of care 
[]  Update interventions per flow sheet      
[]  Discharge due to:_ 
[]  Other:_ Gildardo De La Rosa PTA 2/1/2019  6:59 AM

## 2019-02-05 ENCOUNTER — HOSPITAL ENCOUNTER (OUTPATIENT)
Dept: PHYSICAL THERAPY | Age: 44
Discharge: HOME | End: 2019-02-05
Payer: MEDICARE

## 2019-02-05 PROCEDURE — 97110 THERAPEUTIC EXERCISES: CPT

## 2019-02-05 PROCEDURE — 97140 MANUAL THERAPY 1/> REGIONS: CPT

## 2019-02-05 NOTE — PROGRESS NOTES
PT DAILY TREATMENT NOTE 8-14 Patient Name: Damian Robertson Date:2019 : 1975 [x]  Patient  Verified Payor: [place] MEDICARE / Plan: Jodie Charles [facility] / Product Type: Medicare / In time:9:18  Out time:10:37 Total Treatment Time (min): 79 Total Timed Codes (min): 63 
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 OBJECTIVE Modality rationale: decrease pain and increase tissue extensibility to improve the patients ability to improve functional abilities Type Additional Details  
[] Estim: []Att   []Unatt  []TENS instruct []IFC  []Premod []NMES                       []Other:  []w/US   []w/ice   []w/heat Position: Location:  
[]  Traction: [] Cervical       []Lumbar 
                     [] Prone          []Supine []Intermittent   []Continuous Lbs: 
[] before manual 
[] after manual  
[]  Ultrasound: []Continuous   [] Pulsed []1MHz   []3MHz Location: 
W/cm2:  
[]  Iontophoresis with dexamethasone Location: [] Take home patch  
[] In clinic  
[]  Ice     [x]  heat 
[]  Ice massage Position:seated Location:cervical x 10 minutes  
[]  Vasopneumatic Device Pressure: [] lo [] med [] hi  
Temp: [] lo [] med [] hi  
[] Skin assessment post-treatment:  []intact []redness- no adverse reaction 
     []redness  adverse reaction:  
 
 
54 min Therapeutic Exercise:  [x] See flow sheet :  
Rationale: increase ROM and increase strength to improve the patients ability to improve functional abilities 15 min Manual Therapy:  Instrument assisted soft tissue mobilization applied to suboccipitals and bilateral cervical musculature using GT 1&3 Rationale: increase ROM, increase tissue extensibility and decrease trigger points to improve functional mobility  
       
 min Patient Education: [x] Review HEP    [] Progressed/Changed HEP based on:  
[] positioning   [] body mechanics   [] transfers   [] heat/ice application Other Objective/Functional Measures:  
 
Pain Level (0-10 scale) post treatment: 3/10 ASSESSMENT/Changes in Function: Pt presented with chief c/o increased cervical symptoms from prolonged reaching with performing artwork as well as doing a yoga class since last tx, but was able to tolerate full normal  therex regiment with min to mod challenge today. Pt was also able to tolerate increased resistance from 1 to 2 lbs with cervicothoracic stabs as well as addition of 1 lb with prone stability ball scapular stabs with moderate challenge today. Will continue to progress/advance patient within current POC as tolerated with monitoring symptoms. Patient will continue to benefit from skilled PT services to modify and progress therapeutic interventions, address functional mobility deficits, address ROM deficits, address strength deficits, analyze and address soft tissue restrictions, analyze and cue movement patterns, analyze and modify body mechanics/ergonomics and assess and modify postural abnormalities to attain remaining goals. []  See Plan of Care 
[]  See progress note/recertification 
[]  See Discharge Summary Progress towards goals / Updated goals: PLAN [x]  Upgrade activities as tolerated     []  Continue plan of care 
[]  Update interventions per flow sheet      
[]  Discharge due to:_ 
[]  Other:_ Angelica Cummins PTA 2/5/2019  9:40 AM

## 2019-02-08 ENCOUNTER — HOSPITAL ENCOUNTER (OUTPATIENT)
Dept: PHYSICAL THERAPY | Age: 44
Discharge: HOME | End: 2019-02-08
Payer: MEDICARE

## 2019-02-08 PROCEDURE — 97110 THERAPEUTIC EXERCISES: CPT

## 2019-02-08 PROCEDURE — 97140 MANUAL THERAPY 1/> REGIONS: CPT

## 2019-02-08 NOTE — PROGRESS NOTES
PT DAILY TREATMENT NOTE  Patient Name: Stefani Garcia Date:2019 : 1975 [x]  Patient  Verified Payor: [place] MEDICARE / Plan: Jodie Charles y / Product Type: Medicare / In time:9:19  Out time:10:31 Total Treatment Time (min): 72 Total Timed Codes (min): 56 
1:1 Treatment Time (min): 45 Visit #: 7 of 12 Treatment Area: Neck pain [M54.2] SUBJECTIVE Pain Level (0-10 scale): 3/10 Any medication changes, allergies to medications, adverse drug reactions, diagnosis change, or new procedure performed?: [x] No    [] Yes (see summary sheet for update) Subjective functional status/changes:   [] No changes reported My neck and shoulders felt really good for the rest of the day after I was here last time, I don't know what it was but it was my best day since I started the therapy. OBJECTIVE Modality rationale: decrease pain and increase tissue extensibility to improve the patients ability to improve functional abilities Min Type Additional Details  
 [] Estim: []Att   []Unatt  []TENS instruct []IFC  []Premod []NMES                       []Other:  []w/US   []w/ice   []w/heat Position: Location:  
 []  Traction: [] Cervical       []Lumbar 
                     [] Prone          []Supine []Intermittent   []Continuous Lbs: 
[] before manual 
[] after manual  
 []  Ultrasound: []Continuous   [] Pulsed []1MHz   []3MHz Location: 
W/cm2:  
 []  Iontophoresis with dexamethasone Location: [] Take home patch  
[] In clinic  
 []  Ice     [x]  heat 
[]  Ice massage Position:seated Location:cervical x 10 minutes  
 []  Vasopneumatic Device Pressure: [] lo [] med [] hi  
Temp: [] lo [] med [] hi  
[] Skin assessment post-treatment:  []intact []redness- no adverse reaction 
     []redness  adverse reaction:  
 
 
47 min Therapeutic Exercise:  [x] See flow sheet :  
 Rationale: increase ROM and increase strength to improve the patients ability to improve abilities 15 min Manual Therapy:  Instrument assisted soft tissue mobilization applied to suboccipitals and bilateral cervical musculature using GT 1&3 Rationale: increase ROM, increase tissue extensibility and decrease trigger points to improve functional mobility  
       
 min Patient Education: [x] Review HEP    [] Progressed/Changed HEP based on:  
[] positioning   [] body mechanics   [] transfers   [] heat/ice application Other Objective/Functional Measures:  
 
Pain Level (0-10 scale) post treatment: 2/10 ASSESSMENT/Changes in Function: Pt presented with the most functional reported improvement since initial evaluation with decreased intensity of cervical pain/symptoms for several hours after last treatment. Will continue to progress/advance patient within current POC as tolerated with monitoring symptoms. Patient will continue to benefit from skilled PT services to modify and progress therapeutic interventions, address functional mobility deficits, address ROM deficits, address strength deficits, analyze and address soft tissue restrictions, analyze and cue movement patterns, analyze and modify body mechanics/ergonomics and assess and modify postural abnormalities to attain remaining goals. []  See Plan of Care 
[]  See progress note/recertification 
[]  See Discharge Summary Progress towards goals / Updated goals: PLAN [x]  Upgrade activities as tolerated     []  Continue plan of care 
[]  Update interventions per flow sheet      
[]  Discharge due to:_ 
[]  Other:_ Coleman Engle PTA 2/8/2019  9:20 AM

## 2019-02-12 ENCOUNTER — HOSPITAL ENCOUNTER (OUTPATIENT)
Dept: PHYSICAL THERAPY | Age: 44
Discharge: HOME | End: 2019-02-12
Payer: MEDICARE

## 2019-02-12 PROCEDURE — 97110 THERAPEUTIC EXERCISES: CPT

## 2019-02-12 PROCEDURE — 97140 MANUAL THERAPY 1/> REGIONS: CPT

## 2019-02-12 NOTE — PROGRESS NOTES
[address] [facility] [address] 
[facility] [address], [place], [address]  Phone: [phone]   [phone] PROGRESS NOTE Patient Name: Vipin Smith : 1975 Treatment/Medical Diagnosis: Neck pain [M54.2] Referral Source: Theresa Padilla* Date of Initial Visit: 19 Attended Visits: 8 Missed Visits: 0  
SUMMARY OF TREATMENT Therapeutic exercise for cervical mobility, postural awareness/strengthening, cervicothoracic stabilization, manual intervention, moist heat and HEP. CURRENT STATUS Patient reports approximately 50% overall improvement from therapy since initial evaluation with 3-4/10 pain level on average, increased to 5/10 at the worst with increased forward flexion and reaching type ADL's especially overhead > 40 minutes. Pt is making slow but steady progress with gaining cervical mobility as well as advancing with cervical/UE strengthening with current POC. Pt would benefit from continued therapy for 8 additional visits to achieve maximum medical benefit/potential from current POC. Will continue to progress/advance patient within current POC as tolerated with monitoring symptoms. Cervical AROM= Flexion= 52 degrees, Extension=72 degrees; Side bending= right=52 degrees, left=55 degrees; Rotation= right=88 degrees, left=84 degrees Scapular strength measurements/MMT= Mid traps= right=3+/5, left=4-/5; Lower traps= right=3/5, left=4-/5  strength= right= 67 lbs, left=47 lbs Goal/Measure of Progress Goal Met? 1. Patient will be educated on sitting posture modification to reduce musculoskeletal strain with sitting ADL's  
Status at last Eval: Progressing  Current Status: Met yes 2. Improve mid/low trap strengths to 4/5 to improve postural/scapular control with ADL's  
Status at last Eval: Progressing  Current Status: Not Met no 3.   Improve FOTO outcome score by 8% to indicate a significant improvement in ADL function Status at last Eval: 46/100 Current Status: 61/100 yes 4. Improve dynamometer  strength to 65 lbs to improve daily motor tasks Status at last Eval: Progressing  Current Status: Partially Met Partially Met New Goals to be achieved in __8__  treatments: 
1. Improve mid/low trap strengths to 4/5 to improve postural/scapular control with ADL's 2. Improve dynamometer left  strength to 65 lbs to improve daily motor tasks 3. Pt will report =/> 75% overall improvement since initial evaluation to indicate increased functional improvement from current POC. 4.  Pt will be able to perform increased forward flexion and reaching type ADL's including in overhead planes for > 1 hour without increasing cervical pain >3/10 for increased functional abilities with ADL. RECOMMENDATIONS Continue with current POC for 8 additional visits with advancing as tolerated, then reassess for the need for continuation or discharge from therapy. If you have any questions/comments please contact us directly at [phone]. Thank you for allowing us to assist in the care of your patient. LPTA Signature: Ashleigh Ruiz PTA  Date: 2/12/2019 PT Signature: DEX Cummings, [facility] Time: 9:47 AM  
NOTE TO PHYSICIAN:  PLEASE COMPLETE THE ORDERS BELOW AND FAX TO [facility] at [facility] UNIT: [phone]. If you are unable to process this request in 24 hours please contact our office: [phone]. 
 
___ I have read the above report and request that my patient continue as recommended.  
___ I have read the above report and request that my patient continue therapy with the following changes/special instructions:_________________________________________________________  
___ I have read the above report and request that my patient be discharged from therapy.   
 
Physician Signature:       Date:      Time:

## 2019-02-12 NOTE — PROGRESS NOTES
PT DAILY TREATMENT NOTE 8-14 Patient Name: Washington Cottrell Date:2019 : 1975 [x]  Patient  Verified Payor: [place] [employer] / Plan: Jodie Charles y / Product Type: Medicare / In time:9:13  Out time:10:37 Total Treatment Time (min): 84 Total Timed Codes (min): 68 
1:1 Treatment Time (min): 45 Visit #: 8 of 12 Treatment Area: Neck pain [M54.2] SUBJECTIVE Pain Level (0-10 scale): 2-3/10 Any medication changes, allergies to medications, adverse drug reactions, diagnosis change, or new procedure performed?: [x] No    [] Yes (see summary sheet for update) Subjective functional status/changes:   [] No changes reported My neck has actually been feeling pretty good since I was here last, no real flare ups or new pain over the past few days. OBJECTIVE Modality rationale: decrease pain and increase tissue extensibility to improve the patients ability to improve functional abilities Type Additional Details  
[] Estim: []Att   []Unatt  []TENS instruct []IFC  []Premod []NMES                       []Other:  []w/US   []w/ice   []w/heat Position: Location:  
[]  Traction: [] Cervical       []Lumbar 
                     [] Prone          []Supine []Intermittent   []Continuous Lbs: 
[] before manual 
[] after manual  
[]  Ultrasound: []Continuous   [] Pulsed []1MHz   []3MHz Location: 
W/cm2:  
[]  Iontophoresis with dexamethasone Location: [] Take home patch  
[] In clinic  
[]  Ice     [x]  heat 
[]  Ice massage Position:seated Location:cervical x 10 minutes  
[]  Vasopneumatic Device Pressure: [] lo [] med [] hi  
Temp: [] lo [] med [] hi  
[] Skin assessment post-treatment:  []intact []redness- no adverse reaction 
     []redness  adverse reaction:  
 
 
59 min Therapeutic Exercise:  [x] See flow sheet :  
Rationale: increase ROM and increase strength to improve the patients ability to improve functional abilities 15 min Manual Therapy:   Instrument assisted soft tissue mobilization applied to suboccipitals and bilateral cervical musculature using GT 1&3   
Rationale: decrease pain, increase ROM, increase tissue extensibility and decrease trigger points to improve functional mobility 
    
 min Patient Education: [x] Review HEP    [] Progressed/Changed HEP based on:  
[] positioning   [] body mechanics   [] transfers   [] heat/ice application Other Objective/Functional Measures:  
 
Pain Level (0-10 scale) post treatment: 5/10 ASSESSMENT/Changes in Function:  
 
Patient will continue to benefit from skilled PT services to modify and progress therapeutic interventions, address functional mobility deficits, address ROM deficits, address strength deficits, analyze and address soft tissue restrictions, analyze and cue movement patterns, analyze and modify body mechanics/ergonomics and assess and modify postural abnormalities to attain remaining goals. []  See Plan of Care [x]  See progress note/recertification 
[]  See Discharge Summary Progress towards goals / Updated goals: 
See Progress note/Physician update for full detailed progress towards established goals. PLAN [x]  Upgrade activities as tolerated     []  Continue plan of care 
[]  Update interventions per flow sheet      
[]  Discharge due to:_ 
[]  Other:_ Philippe Mckeon PTA 2/12/2019  9:26 AM 
 
Future Appointments Date Time Provider Adriane Sánchez 2/15/2019  9:15 AM Bishop Jovita PTA [facility]  
2/19/2019  9:15 AM Bishop Jovita PTA [facility]  
2/22/2019  9:15 AM Bishop Jovita PTA [facility]

## 2019-02-15 ENCOUNTER — HOSPITAL ENCOUNTER (OUTPATIENT)
Dept: PHYSICAL THERAPY | Age: 44
Discharge: HOME | End: 2019-02-15
Payer: MEDICARE

## 2019-02-15 PROCEDURE — 97140 MANUAL THERAPY 1/> REGIONS: CPT

## 2019-02-15 PROCEDURE — 97110 THERAPEUTIC EXERCISES: CPT

## 2019-02-15 NOTE — PROGRESS NOTES
PT DAILY TREATMENT NOTE 8 Patient Name: Damian Robertson Date:2/15/2019 : 1975 [x]  Patient  Verified Payor: [place] MED[place] / [facility] Product Type: Medicare / In time:9:16  Out time:10:32 Total Treatment Time (min): 76 Total Timed Codes (min): 60 
1:1 Treatment Time (min): 45 Visit #: 9 of 16 Treatment Area: Neck pain [M54.2] SUBJECTIVE Pain Level (0-10 scale): 5/10 Any medication changes, allergies to medications, adverse drug reactions, diagnosis change, or new procedure performed?: [x] No    [] Yes (see summary sheet for update) Subjective functional status/changes:   [] No changes reported I am still feeling the pain more in the right side of my neck like it was when it started the last time that I was here, but if I do the chin tucks the goes down either one of my arms randomly. OBJECTIVE Modality rationale: decrease pain and increase tissue extensibility to improve the patients ability to improve functional abilities Type Additional Details  
[] Estim: []Att   []Unatt  []TENS instruct []IFC  []Premod []NMES                       []Other:  []w/US   []w/ice   []w/heat Position: Location:  
[]  Traction: [] Cervical       []Lumbar 
                     [] Prone          []Supine []Intermittent   []Continuous Lbs: 
[] before manual 
[] after manual  
[]  Ultrasound: []Continuous   [] Pulsed []1MHz   []3MHz Location: 
W/cm2:  
[]  Iontophoresis with dexamethasone Location: [] Take home patch  
[] In clinic  
[]  Ice     [x]  heat 
[]  Ice massage Position:seated Location:cervical x 10 minutes  
[]  Vasopneumatic Device Pressure: [] lo [] med [] hi  
Temp: [] lo [] med [] hi  
[] Skin assessment post-treatment:  []intact []redness- no adverse reaction 
     []redness  adverse reaction:  
 
 
51 min Therapeutic Exercise:  [x] See flow sheet :  
 Rationale: increase ROM and increase strength to improve the patients ability to improve functional abilities 15 min Manual Therapy:  Instrument assisted soft tissue mobilization applied to suboccipitals and bilateral cervical musculature using GT 1&3   
Rationale: increase ROM, increase tissue extensibility and decrease trigger points to improve functional mobility  
       
 min Patient Education: [x] Review HEP    [] Progressed/Changed HEP based on:  
[] positioning   [] body mechanics   [] transfers   [] heat/ice application Other Objective/Functional Measures:  
 
Pain Level (0-10 scale) post treatment: 4/10 ASSESSMENT/Changes in Function: Pt presented with chief c/o increased intermittently UE radicular symptoms that switches sides randomly since last treatment. Pt presented with most palpable tension in right proximal lateral cervical musculature > upper trap with manual intervention today. Will continue to progress/advance patient within current POC as tolerated with monitoring symptoms. Patient will continue to benefit from skilled PT services to modify and progress therapeutic interventions, address functional mobility deficits, address ROM deficits, address strength deficits, analyze and address soft tissue restrictions, analyze and cue movement patterns, analyze and modify body mechanics/ergonomics and assess and modify postural abnormalities to attain remaining goals. []  See Plan of Care 
[]  See progress note/recertification 
[]  See Discharge Summary Progress towards goals / Updated goals: PLAN [x]  Upgrade activities as tolerated     []  Continue plan of care 
[]  Update interventions per flow sheet      
[]  Discharge due to:_ 
[]  Other:_ Eda Todd PTA 2/15/2019  9:13 AM

## 2019-02-19 ENCOUNTER — HOSPITAL ENCOUNTER (OUTPATIENT)
Dept: PHYSICAL THERAPY | Age: 44
Discharge: HOME | End: 2019-02-19
Payer: MEDICARE

## 2019-02-19 PROCEDURE — 97110 THERAPEUTIC EXERCISES: CPT

## 2019-02-19 PROCEDURE — 97140 MANUAL THERAPY 1/> REGIONS: CPT

## 2019-02-19 NOTE — PROGRESS NOTES
PT DAILY TREATMENT NOTE 8-14 Patient Name: Marques Gill Date:2019 : 1975 [x]  Patient  Verified Payor: [place] MEDICARE / Plan: Jodie Charles y / Product Type: Medicare / In time:9:21  Out time:10:53 Total Treatment Time (min): 92 Total Timed Codes (min): 76 
1:1 Treatment Time (min): 45 Visit #: 10 of 16 Treatment Area: Neck pain [M54.2] SUBJECTIVE Pain Level (0-10 scale): 5/10 Any medication changes, allergies to medications, adverse drug reactions, diagnosis change, or new procedure performed?: [x] No    [] Yes (see summary sheet for update) Subjective functional status/changes:   [] No changes reported I think that I figured out what is going on with my neck, the medication I am taking, Cipro, causes tendonitis and muscle inflammation, so he took me off of that. OBJECTIVE Modality rationale: decrease pain and increase tissue extensibility to improve the patients ability to improve functional abilities Min Type Additional Details  
 [] Estim: []Att   []Unatt  []TENS instruct []IFC  []Premod []NMES                       []Other:  []w/US   []w/ice   []w/heat Position: Location:  
 []  Traction: [] Cervical       []Lumbar 
                     [] Prone          []Supine []Intermittent   []Continuous Lbs: 
[] before manual 
[] after manual  
 []  Ultrasound: []Continuous   [] Pulsed []1MHz   []3MHz Location: 
W/cm2:  
 []  Iontophoresis with dexamethasone Location: [] Take home patch  
[] In clinic  
 []  Ice     [x]  heat 
[]  Ice massage Position:seated Location:cervical x 10 minutes  
 []  Vasopneumatic Device Pressure: [] lo [] med [] hi  
Temp: [] lo [] med [] hi  
[] Skin assessment post-treatment:  []intact []redness- no adverse reaction 
     []redness  adverse reaction:  
 
 
67 min Therapeutic Exercise:  [x] See flow sheet :  
 Rationale: increase ROM and increase strength to improve the patients ability to improve functional abilities 15 min Manual Therapy:   Instrument assisted soft tissue mobilization applied to suboccipitals and bilateral cervical musculature using GT 1&3   
Rationale: decrease pain, increase ROM, increase tissue extensibility and decrease trigger points to improve functional mobility  
       
 min Patient Education: [x] Review HEP    [] Progressed/Changed HEP based on:  
[] positioning   [] body mechanics   [] transfers   [] heat/ice application Other Objective/Functional Measures:  
 
Pain Level (0-10 scale) post treatment: 3/10 ASSESSMENT/Changes in Function: Pt presents with new finding of medication side effects causing tissue tenderness and bruising throughout her body since last treat, which she has stopped since last treatment. Pt had to decrease resistance with cervicothoracic stabs today as well. Will continue to progress/advance patient within current POC as tolerated with monitoring symptoms. Patient will continue to benefit from skilled PT services to modify and progress therapeutic interventions, address functional mobility deficits, address ROM deficits, address strength deficits, analyze and address soft tissue restrictions, analyze and cue movement patterns, analyze and modify body mechanics/ergonomics and assess and modify postural abnormalities to attain remaining goals. []  See Plan of Care 
[]  See progress note/recertification 
[]  See Discharge Summary Progress towards goals / Updated goals: PLAN [x]  Upgrade activities as tolerated     []  Continue plan of care 
[]  Update interventions per flow sheet      
[]  Discharge due to:_ 
[]  Other:_ Darling Mendoza, ALISTAIR 2/19/2019  9:25 AM

## 2019-02-22 ENCOUNTER — HOSPITAL ENCOUNTER (OUTPATIENT)
Dept: PHYSICAL THERAPY | Age: 44
Discharge: HOME | End: 2019-02-22
Payer: MEDICARE

## 2019-02-22 PROCEDURE — 97110 THERAPEUTIC EXERCISES: CPT

## 2019-02-22 PROCEDURE — 97140 MANUAL THERAPY 1/> REGIONS: CPT

## 2019-02-22 NOTE — PROGRESS NOTES
PT DAILY TREATMENT NOTE 8-14 Patient Name: Kris Chavez Date:2019 : 1975 [x]  Patient  Verified Payor: [place] MEDICARE / Plan: Jodie Goldstein / Product Type: Medicare / In time:9:20  Out time:10:39 Total Treatment Time (min): 79 Total Timed Codes (min): 63 
1:1 Treatment Time (min): 45 Visit #: 83 GQ 28 Treatment Area: Neck pain [M54.2] SUBJECTIVE Pain Level (0-10 scale): 5/10 Any medication changes, allergies to medications, adverse drug reactions, diagnosis change, or new procedure performed?: [x] No    [] Yes (see summary sheet for update) Subjective functional status/changes:   [] No changes reported My neck has been hurting more at night lately, but I don't know if I am just noticing it more at night since I am not doing anything. OBJECTIVE Modality rationale: decrease pain and increase tissue extensibility to improve the patients ability to improve functional abilities Min Type Additional Details  
 [] Estim: []Att   []Unatt  []TENS instruct []IFC  []Premod []NMES                       []Other:  []w/US   []w/ice   []w/heat Position: Location:  
 []  Traction: [] Cervical       []Lumbar 
                     [] Prone          []Supine []Intermittent   []Continuous Lbs: 
[] before manual 
[] after manual  
 []  Ultrasound: []Continuous   [] Pulsed []1MHz   []3MHz Location: 
W/cm2:  
 []  Iontophoresis with dexamethasone Location: [] Take home patch  
[] In clinic  
 []  Ice     [x]  heat 
[]  Ice massage Position:seated Location:cervical x 10 minutes  
 []  Vasopneumatic Device Pressure: [] lo [] med [] hi  
Temp: [] lo [] med [] hi  
[] Skin assessment post-treatment:  []intact []redness- no adverse reaction 
     []redness  adverse reaction:  
 
 
54 min Therapeutic Exercise:  [x] See flow sheet :  
Rationale: increase ROM and increase strength to improve the patients ability to improve functional abilities 15 min Manual Therapy:   Instrument assisted soft tissue mobilization applied to suboccipitals and bilateral cervical musculature using GT 1&3   
Rationale: decrease pain, increase ROM, increase tissue extensibility and decrease trigger points to improve functional mobility  
    
 min Patient Education: [x] Review HEP    [] Progressed/Changed HEP based on:  
[] positioning   [] body mechanics   [] transfers   [] heat/ice application Other Objective/Functional Measures:  
 
Pain Level (0-10 scale) post treatment: 3/10 ASSESSMENT/Changes in Function: Pt presents with chief c/o most pain/symptoms at night since last treatment. Pt also presented with increased palpable tissue tension/congestion in right levator scapula with manual intervention today. Will continue to progress/advance patient within current POC as tolerated with monitoring symptoms. Patient will continue to benefit from skilled PT services to modify and progress therapeutic interventions, address functional mobility deficits, address ROM deficits, address strength deficits, analyze and address soft tissue restrictions, analyze and cue movement patterns, analyze and modify body mechanics/ergonomics and assess and modify postural abnormalities to attain remaining goals. []  See Plan of Care 
[]  See progress note/recertification 
[]  See Discharge Summary Progress towards goals / Updated goals: PLAN [x]  Upgrade activities as tolerated     []  Continue plan of care 
[]  Update interventions per flow sheet      
[]  Discharge due to:_ 
[]  Other:_ Jaswant Pearce PTA 2/22/2019  9:37 AM

## 2019-02-27 ENCOUNTER — HOSPITAL ENCOUNTER (OUTPATIENT)
Dept: PHYSICAL THERAPY | Age: 44
Discharge: HOME | End: 2019-02-27
Payer: MEDICARE

## 2019-02-27 PROCEDURE — 97140 MANUAL THERAPY 1/> REGIONS: CPT

## 2019-02-27 PROCEDURE — 97110 THERAPEUTIC EXERCISES: CPT

## 2019-02-27 NOTE — PROGRESS NOTES
PT DAILY TREATMENT NOTE 8-14 Patient Name: Ashli Bennett Date:2019 : 1975 [x]  Patient  Verified Payor: [place] MEDICARE / Plan: Jodie Pedersony / Product Type: Medicare / In time:8:58  Out time:9:53 Total Treatment Time (min): 55 Total Timed Codes (min): 45 
1:1 Treatment Time (min): 45 Visit #: 12 of 16 Treatment Area: Neck pain [M54.2] SUBJECTIVE Pain Level (0-10 scale): 3 Any medication changes, allergies to medications, adverse drug reactions, diagnosis change, or new procedure performed?: [x] No    [] Yes (see summary sheet for update) Subjective functional status/changes:   [] No changes reported No news to report since previous session. OBJECTIVE Modality rationale: decrease pain and increase tissue extensibility to improve the patients ability to change and maintain head and shoulder positions Type Additional Details  
[] Estim: []Att   []Unatt  []TENS instruct []IFC  []Premod []NMES                       []Other:  []w/US   []w/ice   []w/heat Position: Location:  
[]  Traction: [] Cervical       []Lumbar 
                     [] Prone          []Supine []Intermittent   []Continuous Lbs: 
[] before manual 
[] after manual  
[]  Ultrasound: []Continuous   [] Pulsed []1MHz   []3MHz Location: 
W/cm2:  
[]  Iontophoresis with dexamethasone Location: [] Take home patch  
[] In clinic  
[]  Ice     [x]  Heat- 10 minutes 
[]  Ice massage Position: Seated Location: C-spine  
[]  Vasopneumatic Device Pressure: [] lo [] med [] hi  
Temp: [] lo [] med [] hi  
[] Skin assessment post-treatment:  []intact []redness- no adverse reaction 
     []redness  adverse reaction:  
 
 
31 min Therapeutic Exercise:  [] See flow sheet :  
Rationale: increase strength to improve the patients ability to change and maintain head and shoulder positions 14 min Manual Therapy:  DTM to BL rhomboids, upper traps, levator scaps Rationale: increase tissue extensibility to improve the patient's ability to change and maintain head and shoulder positions Pain Level (0-10 scale) post treatment: 2 
 
ASSESSMENT/Changes in Function: Progressed to I, T, and Y scapular exercises laying over the stability ball. Pt demonstrates significant fatigue with lower trap position (Y), and would benefit in scapular posture from further strengthening of this area. Patient will continue to benefit from skilled PT services to modify and progress therapeutic interventions, address strength deficits, analyze and cue movement patterns and assess and modify postural abnormalities to attain remaining goals. PLAN [x]  Upgrade activities as tolerated     []  Continue plan of care 
[]  Update interventions per flow sheet      
[]  Discharge due to:_ 
[]  Other:_ Adriane Velarde 2/27/2019  7:42 AM

## 2019-03-01 ENCOUNTER — HOSPITAL ENCOUNTER (OUTPATIENT)
Dept: PHYSICAL THERAPY | Age: 44
Discharge: HOME | End: 2019-03-01
Payer: MEDICARE

## 2019-03-01 PROCEDURE — 97110 THERAPEUTIC EXERCISES: CPT

## 2019-03-01 PROCEDURE — 97140 MANUAL THERAPY 1/> REGIONS: CPT

## 2019-03-01 NOTE — PROGRESS NOTES
PT DAILY TREATMENT NOTE 8-14 Patient Name: Ivette Nguyen Date:3/1/2019 : 1975 [x]  Patient  Verified Payor: [place] MEDICARE / Plan: Jodie Charles y / Product Type: Medicare / In time:9:20  Out time:10:21 Total Treatment Time (min): 61 Total Timed Codes (min): 45 
1:1 Treatment Time (min): 45 Visit #: 99 EW 80 Treatment Area: Neck pain [M54.2] SUBJECTIVE Pain Level (0-10 scale): 2/10 Any medication changes, allergies to medications, adverse drug reactions, diagnosis change, or new procedure performed?: [x] No    [] Yes (see summary sheet for update) Subjective functional status/changes:   [] No changes reported My neck and arm doesn't hurt as bad as before, but I haven't been doing as much studying and artwork as before, so that may have something to do with it. OBJECTIVE Modality rationale: decrease pain and increase tissue extensibility to improve the patients ability to improve functional abilities Min Type Additional Details  
 [] Estim: []Att   []Unatt  []TENS instruct []IFC  []Premod []NMES                       []Other:  []w/US   []w/ice   []w/heat Position: Location:  
 []  Traction: [] Cervical       []Lumbar 
                     [] Prone          []Supine []Intermittent   []Continuous Lbs: 
[] before manual 
[] after manual  
 []  Ultrasound: []Continuous   [] Pulsed []1MHz   []3MHz Location: 
W/cm2:  
 []  Iontophoresis with dexamethasone Location: [] Take home patch  
[] In clinic  
 []  Ice     [x]  heat 
[]  Ice massage Position:prone Location:thoracic x 10 minutes  
 []  Vasopneumatic Device Pressure: [] lo [] med [] hi  
Temp: [] lo [] med [] hi  
[] Skin assessment post-treatment:  []intact []redness- no adverse reaction 
     []redness  adverse reaction:  
 
 
41 min Therapeutic Exercise:  [x] See flow sheet :  
 Rationale: increase ROM and increase strength to improve the patients ability to improve functional abilities 10 min Manual Therapy:  Prone grade 4 thoracic P<>A mobs and deep elbow releases to intrascapular muscles/rhomboids Rationale: increase ROM, increase tissue extensibility and decrease trigger points to improve functional mobility  
       
 min Patient Education: [x] Review HEP    [] Progressed/Changed HEP based on:  
[] positioning   [] body mechanics   [] transfers   [] heat/ice application Other Objective/Functional Measures:  
 
Pain Level (0-10 scale) post treatment: 1/10 ASSESSMENT/Changes in Function: Pt presented with chief c/o mid thoracic/intrascapular pain/symptoms which was concentrated on with manual intervention today with increased relief reported afterwards. Will continue to progress/advance patient within current POC as tolerated with monitoring symptoms. Patient will continue to benefit from skilled PT services to modify and progress therapeutic interventions, address functional mobility deficits, address ROM deficits, address strength deficits, analyze and address soft tissue restrictions, analyze and cue movement patterns, analyze and modify body mechanics/ergonomics and assess and modify postural abnormalities to attain remaining goals. []  See Plan of Care 
[]  See progress note/recertification 
[]  See Discharge Summary Progress towards goals / Updated goals: PLAN [x]  Upgrade activities as tolerated     []  Continue plan of care 
[]  Update interventions per flow sheet      
[]  Discharge due to:_ 
[]  Other:_ Laurence Ludwig, ALISTAIR 3/1/2019  9:20 AM

## 2019-03-04 ENCOUNTER — HOSPITAL ENCOUNTER (OUTPATIENT)
Dept: PHYSICAL THERAPY | Age: 44
Discharge: HOME | End: 2019-03-04
Payer: MEDICARE

## 2019-03-04 PROCEDURE — 97140 MANUAL THERAPY 1/> REGIONS: CPT

## 2019-03-04 PROCEDURE — 97110 THERAPEUTIC EXERCISES: CPT

## 2019-03-04 NOTE — PROGRESS NOTES
PT DAILY TREATMENT NOTE 8- Patient Name: Stefani Garcia Date:3/4/2019 : 1975 [x]  Patient  Verified Payor: [place] MEDICARE / Plan: Jodie Charles y / Product Type: Medicare / In time:4:11  Out time:5:08 Total Treatment Time (min): 57 Total Timed Codes (min): 47 
1:1 Treatment Time (min): 47 Visit #: 14 of 16 Treatment Area: Neck pain [M54.2] SUBJECTIVE Pain Level (0-10 scale): 1 neck, 5/10 left shoulder Any medication changes, allergies to medications, adverse drug reactions, diagnosis change, or new procedure performed?: [x] No    [] Yes (see summary sheet for update) Subjective functional status/changes:   [] No changes reported Pt reports her neck is improving. Her left shoulder hurts today and she is scheduled for an injection tomorrow. OBJECTIVE Modality rationale: decrease pain and increase tissue extensibility to improve the patients ability to change and maintain head and shoulder positions Type Additional Details  
[] Estim: []Att   []Unatt  []TENS instruct []IFC  []Premod []NMES                       []Other:  []w/US   []w/ice   []w/heat Position: Location:  
[]  Traction: [] Cervical       []Lumbar 
                     [] Prone          []Supine []Intermittent   []Continuous Lbs: 
[] before manual 
[] after manual  
[]  Ultrasound: []Continuous   [] Pulsed []1MHz   []3MHz Location: 
W/cm2:  
[]  Iontophoresis with dexamethasone Location: [] Take home patch  
[] In clinic  
[]  Ice     [x]  Heat- 10 min 
[]  Ice massage Position: Seated Location: Cervical  
[]  Vasopneumatic Device Pressure: [] lo [] med [] hi  
Temp: [] lo [] med [] hi  
[] Skin assessment post-treatment:  []intact []redness- no adverse reaction 
     []redness  adverse reaction:  
 
 
47 min Therapeutic Exercise:  [] See flow sheet :  
Rationale: increase strength and improve coordination to improve the patients ability to change and maintain head and shoulder positions 10 min Manual Therapy:  STM to BL rhomboids, HVLA to mid T-spine Rationale: increase tissue extensibility to improve the patient's ability to change and maintain head and shoulder positions Pain Level (0-10 scale) post treatment: 4/10 shoulder, 1/10 neck ASSESSMENT/Changes in Function: Pt has 1 remaining therapy visit and will likely have met max benefit for skilled therapy services. Will review and update HEP accordingly. Patient will continue to benefit from skilled PT services to modify and progress therapeutic interventions, address functional mobility deficits, address strength deficits, analyze and address soft tissue restrictions and analyze and cue movement patterns to attain remaining goals. []  See Plan of Care 
[]  See progress note/recertification 
[]  See Discharge Summary Progress towards goals / Updated goals: PLAN 
[]  Upgrade activities as tolerated     [x]  Continue plan of care 
[]  Update interventions per flow sheet      
[]  Discharge due to:_ 
[]  Other:_ Adriane Mary 3/4/2019  10:24 AM

## 2019-03-05 ENCOUNTER — APPOINTMENT (OUTPATIENT)
Dept: PHYSICAL THERAPY | Age: 44
End: 2019-03-05
Payer: MEDICARE

## 2019-03-08 ENCOUNTER — HOSPITAL ENCOUNTER (OUTPATIENT)
Dept: PHYSICAL THERAPY | Age: 44
Discharge: HOME | End: 2019-03-08
Payer: MEDICARE

## 2019-03-08 PROCEDURE — 97110 THERAPEUTIC EXERCISES: CPT

## 2019-03-08 PROCEDURE — 97140 MANUAL THERAPY 1/> REGIONS: CPT

## 2019-03-08 NOTE — PROGRESS NOTES
PT DAILY TREATMENT NOTE     Patient Name: Queta Healy  Date:3/8/2019  : 1975  [x]  Patient  Verified  Payor: [place] MEDICARE / Plan: [place] MEDICARE PART A & B / Product Type: Medicare /    In time:8:02  Out time:8:54  Total Treatment Time (min): 52  Total Timed Codes (min): 42  1:1 Treatment Time (min): 42   Visit #: 15 of 16    Treatment Area: Neck pain [M54.2]    SUBJECTIVE  Pain Level (0-10 scale):  2  Any medication changes, allergies to medications, adverse drug reactions, diagnosis change, or new procedure performed?: [x] No    [] Yes (see summary sheet for update)  Subjective functional status/changes:   [] No changes reported  See Discharge Summary    OBJECTIVE  Modality rationale: decrease pain and increase tissue extensibility to improve the patients ability to change and maintain head and shoulder positions   Min Type Additional Details    [] Estim: []Att   []Unatt        []TENS instruct                  []IFC  []Premod   []NMES                     []Other:  []w/US   []w/ice   []w/heat  Position:  Location:    []  Traction: [] Cervical       []Lumbar                       [] Prone          []Supine                       []Intermittent   []Continuous Lbs:  [] before manual  [] after manual    []  Ultrasound: []Continuous   [] Pulsed                           []1MHz   []3MHz Location:  W/cm2:    []  Iontophoresis with dexamethasone         Location: [] Take home patch   [] In clinic   10 []  Ice     [x]  heat  []  Ice massage Position:Seated  Location: C-spine    []  Vasopneumatic Device Pressure:       [] lo [] med [] hi   Temperature: [] lo [] med [] hi   [] Skin assessment post-treatment:  []intact []redness- no adverse reaction       []redness  adverse reaction:       32 min Therapeutic Exercise:  [] See flow sheet :   Rationale: increase ROM and increase strength to improve the patients ability to change and maintain head and shoulder positions    Included update and review of HEP prior to discharge     10 min Manual Therapy:  TPR/STM to BL rhomboids, upper traps   Rationale: increase tissue extensibility to improve the patient's ability to change upper limb position for lifting, reaching and dressing tasks    Pain Level (0-10 scale) post treatment: 1    ASSESSMENT/Changes in Function:      []  See Plan of Care  []  See progress note/recertification  [x]  See Discharge Summary         PLAN  []  Upgrade activities as tolerated     []  Continue plan of care  []  Update interventions per flow sheet       [x]  Discharge due to:_See discharge summary  []  Other:_      Frank Meyer, PT 3/8/2019  8:49 AM

## 2019-03-08 NOTE — PROGRESS NOTES
[facility] AT [place] [address], [place], [address] [place] [address] Road  Phone: [phone]   [phone]  DISCHARGE SUMMARY  Patient Name: Jake Sprague : 1975   Treatment/Medical Diagnosis: Neck pain [M54.2]   Referral Source: Ponce Yadav*     Date of Initial Visit: 17 Attended Visits: 15 Missed Visits: 0     SUMMARY OF TREATMENT  Therapeutic exercise for cervical and scapular stabilization, manual therapy, pain modalities, HEP  CURRENT STATUS  Patient reported >60% overall improvement in her neck since the start of therapy. We have focused extensively on posture and scapular stabilization, and pt demonstrated improvement with up to 4/5 mid/lower trapezius strength. She reports average of 2/10 in her neck, down to 1/10 after therapy sessions. We have reviewed and updated long term HEP for patient to continue, and she agrees she should be able to manage her symptoms independently through these activities. Remaining goals from last assessment:   1. Improve mid/low trap strengths to 4/5 to improve postural/scapular control with ADL's- Met   2. Improve dynamometer left  strength to 65 lbs to improve daily motor tasks- No change   3. Pt will report =/> 75% overall improvement since initial evaluation to indicate increased functional improvement from current POC. Progressing   4. Pt will be able to perform increased forward flexion and reaching type ADL's including in overhead planes for > 1 hour without increasing cervical pain >3/10 for increased functional abilities with ADL. Met/progressing          RECOMMENDATIONS  Discontinue therapy. Progressing towards or have reached established goals. If you have any questions/comments please contact us directly at [phone]. Thank you for allowing us to assist in the care of your patient.     Therapist Signature: Pablo Guzman, PT, OCS Date: 3/8/19     Time: 8:59 AM NOTE TO PHYSICIAN:  Your patient's insurance requires this discharge note be signed and returned. PLEASE COMPLETE THE ORDERS BELOW AND RETURN TO:  [facility]    ___ I have read the above report and request that my patient be discharged from therapy.      Physician Signature:        Date:       Time:

## 2019-05-13 ENCOUNTER — HOSPITAL ENCOUNTER (OUTPATIENT)
Dept: PHYSICAL THERAPY | Age: 44
Discharge: HOME | End: 2019-05-13
Payer: MEDICARE

## 2019-05-13 PROCEDURE — 97162 PT EVAL MOD COMPLEX 30 MIN: CPT

## 2019-05-13 PROCEDURE — 97110 THERAPEUTIC EXERCISES: CPT

## 2019-05-13 NOTE — PROGRESS NOTES
[facility] AT [address] [place] [address] [address], [place], [address]  Phone: [phone]   [phone] PLAN OF CARE / STATEMENT OF MEDICAL NECESSITY FOR PHYSICAL THERAPY SERVICES Patient Name: Jorden Zuniga : 1975 Medical  
Diagnosis: Bilateral leg weakness [R29.898] Unsteady gait [R26.81] Treatment Diagnosis: Bilateral leg weakness [R29.898] Unsteady gait [R26.81] Onset Date:  Referral Source: Geraldo Segovia MD [facility]): 2019 Prior Hospitalization: See medical history Provider #: [id number] Prior Level of Function: Independent Comorbidities:   
Medications: Verified on Patient Summary List  
The Plan of Care and following information is based on the information from the initial evaluation.  
=========================================================================================== Assessment / key information:  Patient is a 37year old female with chronic lower extremity weakness and pain. 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 Patient states she sustained a severe sprain and fracture of fibula around 2018. Patient reports she likes to run but has pain and difficulty with weakness, clumsiness. Patient states that she has severe cramping in right gastrocnemius with her primary doctor reporting possible tear. Pain is located to achilles and feet; described as severe cramping, tightness, numbness and tingling Current: 6/10; Worse: 7/10 - aggravated with stairs, running, walking; Best: 5/10 - eased with medications, ice, rest 
 
PMHx: Patient has neuropathy and muscle atrophy. Patient reports numerous falls and tripping with having drop foot Good balance EO / Poor balance EC Squat: weight shift to right Stairs: increased plantar flexion Patient demonstrates sporatic behavior both verbally and mechanically. ROM: Full bilateral lower extremity Slight tightness to right soleus Strength: 5/5 BLE with exception to right ankle DF 3/5, Inversion 3+/5 Stability: Negative bilateral knee; positive right ankle anterior drawer with pain and positive right talar tilt Functional Gait Assessment: 15 out of 30 points with greatest difficulty in narrow base of support Patient issued written HEP for improved motor control and balance training. 
=========================================================================================== History HIGH Complexity :3+ comorbidities / personal factors will impact the outcome/ POC ; Examination HIGH Complexity : 4+ Standardized tests and measures addressing body structure, function, activity limitation and / or participation in recreation ; Presentation MEDIUM Complexity : Evolving with changing characteristics ; Decision Making MEDIUM Complexity : FOTO score of 26-74; Complexity MEDIUM Problem List: pain affecting function, impaired gait/ balance, decrease ADL/ functional abilitiies and decrease activity tolerance Treatment Plan may include any combination of the following: Therapeutic exercise, Neuromuscular re-education, Physical agent/modality, Manual therapy, Patient education, Self Care training, Functional mobility training and Stair training Patient / Family readiness to learn indicated by: asking questions, trying to perform skills and interest 
Persons(s) to be included in education: patient (P) Barriers to Learning/Limitations: None Measures taken:   
Patient Goal (s): Return to running Patient self reported health status: poor Rehabilitation Potential: poor · Short Term Goals: To be accomplished in  1-2  weeks: Independent/compliant with long term HEP for ROM, flexibility and strength Improve active dorsiflexion by 5 degrees to improve/normalize gait cycle · Long Term Goals: To be accomplished in  8-12  treatments: 
1. Improve FOTO outcome score by 7% to indicate a significant improvement in ADL function 2. Pt will verbalize 50% overall improvement in functional ADL's in order to progress towards PLOF. 3. Pt will demonstrate 4+/5 gross BL LE strength in order to improve gait and ambulation. 4. Patient will report 50% improvement in walking long distance (1/4 mile) to work towards patient desire to return to running. Frequency / Duration:   Patient to be seen  2  times per week for 6  weeks: 
Patient / Caregiver education and instruction: self care, activity modification and exercises Therapist Signature: Maria E Butterfield PT Date: 5/13/2019 Certification Period: 5/13/2019 to 8/12/2019 Time: 8:18 AM  
=========================================================================================== I certify that the above Physical Therapy Services are being furnished while the patient is under my care. I agree with the treatment plan and certify that this therapy is necessary. Physician Signature:       Date:      Time:  Please sign and return to [facility] or you may fax the signed copy to [phone]. Thank you.

## 2019-05-13 NOTE — PROGRESS NOTES
PT EVALUATION/ DAILY TREATMENT NOTE Patient Name: Federica Herrera Date:2019 : 1975 [x]  Patient  Verified Payor: [place] MEDICARE / Plan: Jodie Charles y / Product Type: Medicare / In time:8:30  Out time:9:20 Total Treatment Time (min): 50 Total Timed Codes (min): 10 
1:1 Treatment Time ( only): 10 Visit #: 1 of 12 SUBJECTIVE Pain Level (0-10 scale): 6/10 Patient is a 37year old female with chronic lower extremity weakness and pain. 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 Patient states she sustained a severe sprain and fracture of fibula around 2018. Patient reports she likes to run but has pain and difficulty with weakness, clumsiness. Patient states that she has severe cramping in right gastrocnemius with her primary doctor reporting possible tear. 
  
Pain is located to achilles and feet; described as severe cramping, tightness, numbness and tingling Current: 6/10; Worse: 7/10 - aggravated with stairs, running, walking; Best: 5/10 - eased with medications, ice, rest 
  
PMHx: Patient has neuropathy and muscle atrophy. Patient reports numerous falls and tripping with having drop foot 
  
 
 
Physical Therapy Evaluation  - Foot and Ankle Other tests/ comments: 
Good balance EO / Poor balance EC Squat: weight shift to right Stairs: increased plantar flexion Patient demonstrates sporatic behavior both verbally and mechanically. ROM: Full bilateral lower extremity Slight tightness to right soleus Strength: 5/5 BLE with exception to right ankle DF 3/5, Inversion 3+/5 Stability: Negative bilateral knee; positive right ankle anterior drawer with pain and positive right talar tilt Functional Gait Assessment: 15 out of 30 points with greatest difficulty in narrow base of support 
  
Patient issued written HEP for improved motor control and balance training. OBJECTIVE TREATMENT: 
 
Therapeutic Exercise: [x] see flow sheet     [] Other:_ Added/Changed Exercises: 
[]  Added:_  to improve (function): 
[]  Changed:_ to improve (function): 
(minutes: 10) Other Objective/Functional Measures:  
Pain Level (0-10 scale) post treatment: 6/10 ASSESSMENT[x]  See Plan of Care PLAN [x] Other:See Plan of Care_ Stefanie Keller, PT 5/13/2019  10:13 AM

## 2019-05-13 NOTE — PROGRESS NOTES
Request for use of Dry Needling/Intramuscular Manual Therapy Patient: Mahad Ramírez     Referral Source: Arabella Mancia MD 
Diagnosis: Bilateral leg weakness [R29.898] Unsteady gait [R26.81]      : 1975 Date of initial visit: 2019   Attended visits: 1  Missed Visits: 0 Based on findings from the physical therapy examination and evaluation, the evaluating therapist believes the patient, Mahad Ramírez  would benefit from including Dry Needling as part of the plan of care. Dry needling is a treatment technique utilized in conjunction with other PT interventions to inactivate myofascial trigger points and the pain and dysfunction they cause. Dry Needling is an advanced procedure that requires additional training including greater than 54 hours of intensive course work. Physical Therapists at [address] [place] [address] are certified through [address] [place] [address] courses for their education and are certified to perform treatments. PROCEDURE: 
? Solid filament sterile needle (typically 0.3mm/30 gauge) inserted into a trigger point ? Repeated movements inactivate the trigger points, taking 30-60 seconds per site ? Typically consists of 1 dry needling session per week and a possible second treatment including muscle re-education, flexibility, strengthening and other manual techniques to facilitate the benefits of dry needling BENEFITS: 
? Inactivation of trigger points ? Decreased pain ? Increased muscle length ? Improved movement patterns ? Restoration of function POTENTIAL RISKS: 
? Post-needling soreness ? Infection ? Bruising/bleeding ? Penetration of a nerve ? Pneumothorax All treating PTs have been thoroughly educated in avoiding adverse reactions If you agree with this recommendation, please sign this form and fax it to us at [phone].   If you have questions or concerns regarding dry needling or any other treatment we may be providing, please contact us at [phone]. Thank you for allowing us to assist in the care of your patient. Aiden Sanders, PT    5/13/2019 2:19 PM  
NOTE TO PHYSICIAN:  PLEASE COMPLETE THE ORDERS BELOW AND  
FAX TO [facility]: [phone] If you are unable to process this request in 24 hours please contact our office:  
[phone] 
 
? I have read the above request and AGREE to the recommendation of including dry needling as part of the plan of care. ? I have read the above request and DO NOT AGREE to including dry needling as part of the plan of care. ? I have read the above report and request that my patient continue therapy with the following changes/special instructions: 
________________________________________________________________________ Physicians signature: _______________________________________________ Date: ______________Time:_______________

## 2019-05-16 ENCOUNTER — HOSPITAL ENCOUNTER (OUTPATIENT)
Dept: PHYSICAL THERAPY | Age: 44
Discharge: HOME | End: 2019-05-16
Payer: MEDICARE

## 2019-05-16 PROCEDURE — 97110 THERAPEUTIC EXERCISES: CPT

## 2019-05-16 PROCEDURE — 97140 MANUAL THERAPY 1/> REGIONS: CPT

## 2019-05-16 PROCEDURE — 97112 NEUROMUSCULAR REEDUCATION: CPT

## 2019-05-16 NOTE — PROGRESS NOTES
PT DAILY TREATMENT NOTE 8 Patient Name: Lopez Flores Date:2019 : 1975 [x]  Patient  Verified Payor: VA MEDICARE / Plan: Jodie Charles y / Product Type: Medicare / In time:7:44  Out time:8:57 Total Treatment Time (min): 73 Total Timed Codes (min): 67 
1:1 Treatment Time (min):45 Visit #: 2 of 12 Treatment Area: Bilateral leg weakness [R29.898] Unsteady gait [R26.81] SUBJECTIVE Pain Level (0-10 scale): 3/10 Any medication changes, allergies to medications, adverse drug reactions, diagnosis change, or new procedure performed?: [x] No    [] Yes (see summary sheet for update) Subjective functional status/changes:   [] No changes reported My right leg usually bothers me and feels weaker than the left one, they usually just feel fatigued and I can't run at all like I used too. OBJECTIVE 53 min Therapeutic Exercise:  [x] See flow sheet :  
Rationale: increase strength and improve coordination to improve the patients ability to improve functional abilities 8 min Neuromuscular Re-education:  [x]  See flow sheet :  
Rationale: improve balance and increase proprioception  to improve the patients ability to improve confidence and stability with standing and walking ADL's 
 
12 min Manual Therapy:  Instrument assisted soft tissue mobilization applied to bilateral calves using GT-1 Rationale: increase ROM, increase tissue extensibility and decrease trigger points to improve functional mobility  
    
 min Patient Education: [x] Review HEP    [] Progressed/Changed HEP based on:  
[] positioning   [] body mechanics   [] transfers   [] heat/ice application Other Objective/Functional Measures:  
 
Pain Level (0-10 scale) post treatment: 1/10 ASSESSMENT/Changes in Function: Pt tolerated all new therex well upon trial today with chief c/o right>left LE pain and decreased strength/endurance.  Pt also presented with increased lateral calf/musculotendinous junction tension with manual intervention today. Pt needs the most focus on LE strengthening and endurance. Will continue to progress/advance patient within current POC as tolerated with monitoring symptoms. Patient will continue to benefit from skilled PT services to modify and progress therapeutic interventions, address strength deficits, analyze and address soft tissue restrictions and analyze and cue movement patterns to attain remaining goals. []  See Plan of Care 
[]  See progress note/recertification 
[]  See Discharge Summary Progress towards goals / Updated goals: PLAN [x]  Upgrade activities as tolerated     []  Continue plan of care 
[]  Update interventions per flow sheet      
[]  Discharge due to:_ 
[]  Other:_ Dagoberto Carlos, PTA 5/16/2019  7:43 AM

## 2019-05-22 ENCOUNTER — APPOINTMENT (OUTPATIENT)
Dept: PHYSICAL THERAPY | Age: 44
End: 2019-05-22
Payer: MEDICARE

## 2019-05-24 ENCOUNTER — APPOINTMENT (OUTPATIENT)
Dept: PHYSICAL THERAPY | Age: 44
End: 2019-05-24
Payer: MEDICARE

## 2019-05-29 ENCOUNTER — HOSPITAL ENCOUNTER (OUTPATIENT)
Dept: PHYSICAL THERAPY | Age: 44
Discharge: HOME | End: 2019-05-29
Payer: MEDICARE

## 2019-05-29 PROCEDURE — 97110 THERAPEUTIC EXERCISES: CPT

## 2019-05-29 PROCEDURE — 97112 NEUROMUSCULAR REEDUCATION: CPT

## 2019-05-29 PROCEDURE — 97140 MANUAL THERAPY 1/> REGIONS: CPT

## 2019-05-29 NOTE — PROGRESS NOTES
PT DAILY TREATMENT NOTE 8-    Patient Name: Saleem Vega  Date:2019  : 1975  [x]  Patient  Verified  Payor: Julian Prescott / Plan: VA MEDICARE PART A & B / Product Type: Medicare /    In time:10:15  Out time:11:27  Total Treatment Time (min): 72  Total Timed Codes (min): 66  1:1 Treatment Time (min): 66   Visit #: 3 of 12    Treatment Area: Bilateral leg weakness [R29.898]  Unsteady gait [R26.81]    SUBJECTIVE  Pain Level (0-10 scale): 10  Any medication changes, allergies to medications, adverse drug reactions, diagnosis change, or new procedure performed?: [x] No    [] Yes (see summary sheet for update)  Subjective functional status/changes:   [] No changes reported  I have been feeling most of the pain on the inside of my right lower leg between the bone and my calf muscle especially when I attempt to run or with going up and down the stairs.     OBJECTIVE      49 min Therapeutic Exercise:  [x] See flow sheet :   Rationale: increase strength and improve coordination to improve the patients ability to improve functional abilities    8 min Neuromuscular Re-education:  [x]  See flow sheet :   Rationale: improve balance and increase proprioception  to improve the patients ability to improve confidence and stability with standing and walking ADL's    15 min Manual Therapy:  Instrument assisted soft tissue mobilization applied to left calf and right medial gastroc/tibial junction and lateral shin using GT-1,3&4   Rationale: increase ROM, increase tissue extensibility and decrease trigger points to improve functional mobility            min Patient Education: [x] Review HEP    [] Progressed/Changed HEP based on:   [] positioning   [] body mechanics   [] transfers   [] heat/ice application        Other Objective/Functional Measures:     Pain Level (0-10 scale) post treatment: 2/10    ASSESSMENT/Changes in Function: Pt presented with new chief c/o increased pain/symptoms in  right medial gastroc/tibial junction with attempting to run as well as with ascending/descending stairs since last treatment. Pt did report increased benefit from focusing on this region with manual intervention today. Will continue to progress/advance patient within current POC as tolerated with monitoring symptoms. Patient will continue to benefit from skilled PT services to modify and progress therapeutic interventions, address strength deficits, analyze and address soft tissue restrictions and analyze and cue movement patterns to attain remaining goals.      []  See Plan of Care  []  See progress note/recertification  []  See Discharge Summary         Progress towards goals / Updated goals:      PLAN  [x]  Upgrade activities as tolerated     []  Continue plan of care  []  Update interventions per flow sheet       []  Discharge due to:_  []  Other:_      Marquez Oh, ALISTAIR 5/29/2019  10:29 AM

## 2019-05-31 ENCOUNTER — HOSPITAL ENCOUNTER (OUTPATIENT)
Dept: PHYSICAL THERAPY | Age: 44
Discharge: HOME | End: 2019-05-31
Payer: MEDICARE

## 2019-05-31 PROCEDURE — 97140 MANUAL THERAPY 1/> REGIONS: CPT

## 2019-05-31 PROCEDURE — 97110 THERAPEUTIC EXERCISES: CPT

## 2019-05-31 PROCEDURE — 97112 NEUROMUSCULAR REEDUCATION: CPT

## 2019-05-31 NOTE — PROGRESS NOTES
PT DAILY TREATMENT NOTE 8-    Patient Name: Radha Ross  Date:2019  : 1975  [x]  Patient  Verified  Payor: VA MEDICARE / Plan: VA MEDICARE PART A & B / Product Type: Medicare /    In time:7:54  Out time:9:00  Total Treatment Time (min): 66  Total Timed Codes (min): 60  1:1 Treatment Time (min): 38   Visit #: 4 of 12    Treatment Area: Bilateral leg weakness [R29.898]  Unsteady gait [R26.81]    SUBJECTIVE  Pain Level (0-10 scale): 3.5/10  Any medication changes, allergies to medications, adverse drug reactions, diagnosis change, or new procedure performed?: [x] No    [] Yes (see summary sheet for update)  Subjective functional status/changes:   [] No changes reported  My legs have been feeling better overall, but my legs just feel tired a lot when I do the bike or walk a lot. OBJECTIVE      46 min Therapeutic Exercise:  [x] See flow sheet :   Rationale:  increase strength and improve coordination to improve the patients ability to improve functional abilities    8 min Neuromuscular Re-education:  [x]  See flow sheet :   Rationale: improve balance and increase proprioception  to improve the patients ability to improve confidence and stability with standing and walking ADL's    12 min Manual Therapy:  STM/tissue mobs to right calf and medial gastroc/tibial junction    Rationale:  increase ROM, increase tissue extensibility and decrease trigger points to improve functional mobility            min Patient Education: [x] Review HEP    [] Progressed/Changed HEP based on:   [] positioning   [] body mechanics   [] transfers   [] heat/ice application        Other Objective/Functional Measures:     Pain Level (0-10 scale) post treatment: 2/10    ASSESSMENT/Changes in Function: Pt presented with light bruising in right medial gastroc/tibial region from [facility] last treatment which was held in place of STM/tissue mobs to region today.  Pt reported most symptoms in distal right LE today as well as general bilateral LE fatigue with prolonged biking/walking activities. Will continue to progress/advance patient within current POC as tolerated with monitoring symptoms. Patient will continue to benefit from skilled PT services to modify and progress therapeutic interventions, address functional mobility deficits, address ROM deficits, address strength deficits, analyze and address soft tissue restrictions and analyze and cue movement patterns to attain remaining goals.      []  See Plan of Care  []  See progress note/recertification  []  See Discharge Summary         Progress towards goals / Updated goals:      PLAN  [x]  Upgrade activities as tolerated     []  Continue plan of care  []  Update interventions per flow sheet       []  Discharge due to:_  []  Other:_      Roland Finnegan PTA 5/31/2019  8:09 AM

## 2019-06-03 ENCOUNTER — HOSPITAL ENCOUNTER (OUTPATIENT)
Dept: PHYSICAL THERAPY | Age: 44
Discharge: HOME | End: 2019-06-03
Payer: MEDICARE

## 2019-06-03 PROCEDURE — 97110 THERAPEUTIC EXERCISES: CPT

## 2019-06-03 PROCEDURE — 97140 MANUAL THERAPY 1/> REGIONS: CPT

## 2019-06-03 PROCEDURE — 97112 NEUROMUSCULAR REEDUCATION: CPT

## 2019-06-03 NOTE — PROGRESS NOTES
PHYSICAL THERAPY - DAILY TREATMENT NOTE    Patient Name: Jesse Cagle        Date: 6/3/2019  : 1975   yes Patient  Verified  Visit #:      of   12  Insurance: Payor: Flaca Breath / Plan: [place] MED[place] & B / Product Type: Medicare /      In time: 11:41 Out time: 12:50   Total Treatment Time: 69     Medicare/BCBS Time Tracking (below)   Total Timed Codes (min):  69 1:1 Treatment Time:  59     TREATMENT AREA =  Bilateral leg weakness [R29.898]  Unsteady gait [R26.81]    SUBJECTIVE  Pain Level (on 0 to 10 scale):    / 10   Medication Changes/New allergies or changes in medical history, any new surgeries or procedures?    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 Pt reports inc pain in the L leg today for unknown reason.           OBJECTIVE    51 min Therapeutic Exercise:  [x]  See flow sheet   Rationale:      increase ROM, increase strength, improve balance and increase proprioception to improve the patients ability to ambulate     10 min Manual Therapy: TPR/DTM to the R post tib, medial gastroc   Rationale:      decrease pain, increase ROM, increase tissue extensibility and decrease trigger points to improve patient's ability to complete ambulate    8 min Neuromuscular Re-ed: -  See flow sheet   Rationale:    improve coordination, improve balance and increase proprioception to improve the patients ability to ambulate on uneven surfaces    Billed With/As:   [x] TE   [] TA   [] Neuro   [] Self Care Patient Education: [x] Review HEP    [] Progressed/Changed HEP based on:   [] positioning   [] body mechanics   [] transfers   [] heat/ice application    [] other:      Other Objective/Functional Measures:    41' 1:1 TE    Pt requiring cueing for knee alignment t/o squats and lunges to prevent genu valgum and excessive anterior knee translation  Marked ankle strategy noted during tandem gait w/ intermittent stepping strategy     Post Treatment Pain Level (on 0 to 10) scale:   2  / 10     ASSESSMENT  Assessment/Changes in Function:     Plan to add miniband to squats at NV to improve lateral hip stability. Unable to perform DN this visit 2' improper attire making inability to expose skin of gastroc complex     []  See Progress Note/Recertification   Patient will continue to benefit from skilled PT services to modify and progress therapeutic interventions, address functional mobility deficits, address ROM deficits, address strength deficits, analyze and address soft tissue restrictions, analyze and cue movement patterns, analyze and modify body mechanics/ergonomics, assess and modify postural abnormalities, address imbalance/dizziness and instruct in home and community integration to attain remaining goals.    Progress toward goals / Updated goals:    Plan for formal reassessment of goals at NV to determine appropriateness for cont vs DC     PLAN  []  Upgrade activities as tolerated yes Continue plan of care   []  Discharge due to :    []  Other:      Therapist: Jesus Angelo PT    Date: 6/3/2019 Time: 11:40 AM     Future Appointments   Date Time Provider Adriane Sánchez   6/3/2019 11:45 AM Katarina Ayala PT [facility]   6/17/2019  9:45 AM Zack Braga PTA [facility]   6/19/2019  9:00 AM McKissick, Kirkwood Goldmann, TALON [facility]   6/24/2019  9:45 AM Zack Braga PTA [facility]   6/26/2019  9:00 AM McKissick, Kirkwood Goldmann, TALON [facility]

## 2019-06-17 ENCOUNTER — HOSPITAL ENCOUNTER (OUTPATIENT)
Dept: PHYSICAL THERAPY | Age: 44
Discharge: HOME | End: 2019-06-17
Payer: MEDICARE

## 2019-06-17 PROCEDURE — 97110 THERAPEUTIC EXERCISES: CPT

## 2019-06-17 PROCEDURE — 97140 MANUAL THERAPY 1/> REGIONS: CPT

## 2019-06-17 PROCEDURE — 97112 NEUROMUSCULAR REEDUCATION: CPT

## 2019-06-17 NOTE — PROGRESS NOTES
PT DAILY TREATMENT NOTE 8-    Patient Name: Yamila Bishop  Date:2019  : 1975  [x]  Patient  Verified  Payor: Shirley Rubio / Plan: [place] MEDICARE PART A & B / Product Type: Medicare /    In time:9:54  Out time:11:07  Total Treatment Time (min): 73  Total Timed Codes (min): 67  1:1 Treatment Time (min): 38  Visit #: 6 of 12    Treatment Area: Bilateral leg weakness [R29.898]  Unsteady gait [R26.81]    SUBJECTIVE  Pain Level (0-10 scale): 2-3/10  Any medication changes, allergies to medications, adverse drug reactions, diagnosis change, or new procedure performed?: [x] No    [] Yes (see summary sheet for update)  Subjective functional status/changes:   [] No changes reported  My legs have been feeling a little bit better even with hiking over 30 miles through the [facility] since I was here last.    OBJECTIVE      55 min Therapeutic Exercise:  [x] See flow sheet :   Rationale: increase strength and improve coordination to improve the patients ability to improve functional abilities    8 min Neuromuscular Re-education:  [x]  See flow sheet :   Rationale: improve balance and increase proprioception  to improve the patients ability to improve confidence and stability with standing and walking ADL's    10 min Manual Therapy:  STM/tissue mobs to right calf and medial gastroc/tibial junction    Rationale: increase ROM, increase tissue extensibility and decrease trigger points to improve functional mobility         min Patient Education: [x] Review HEP    [] Progressed/Changed HEP based on:   [] positioning   [] body mechanics   [] transfers   [] heat/ice application        Other Objective/Functional Measures:     Pain Level (0-10 scale) post treatment: 1/10    ASSESSMENT/Changes in Function:     Patient will continue to benefit from skilled PT services to modify and progress therapeutic interventions, address strength deficits, analyze and address soft tissue restrictions and analyze and cue movement patterns to attain remaining goals. []  See Plan of Care  [x]  See progress note/recertification  []  See Discharge Summary         Progress towards goals / Updated goals:  See Progress note/Physician update for full detailed progress towards established goals.     PLAN  [x]  Upgrade activities as tolerated     []  Continue plan of care  []  Update interventions per flow sheet       []  Discharge due to:_  []  Other:_      Pete Labor, PTA 6/17/2019  9:56 AM

## 2019-06-17 NOTE — PROGRESS NOTES
[facility] AT [address], [address] [place] [address] Road  Phone: [phone]   [phone]  PROGRESS NOTE  Patient Name: Margie Alejandra : 1975   Treatment/Medical Diagnosis: Bilateral leg weakness [R29.898]  Unsteady gait [R26.81]   Referral Source: Gemini Harrison MD     Date of Initial Visit: 19 Attended Visits: 6 Missed Visits: 1     SUMMARY OF TREATMENT  Therapeutic exercise for bilateral LE strengthening and biomechanical symmetry, proprioceptive/balance awareness, manual intervention and HEP. CURRENT STATUS  Patient reports approximately 30% overall improvement from therapy since initial evaluation with 2/10 pain level on average, increased to 4/10 at the worst with ascending stairs and inclines with hiking since last treatment. Pt presents with chief c/o increased right medial calf pain over the past few treatments which has been focused on with manual intervention during treatment. Pt is making slow but steady progress with advancing with bilateral LE strengthening and balance/proprioceptive awareness with current POC. Pt would benefit from continued therapy for 9 additional visits to achieve maximum medical benefit/potential from current POC. Will continue to progress/advance patient within current POC as tolerated with monitoring symptoms. Ankle strength measurements/MMT= DF=right=4+/5, left=4/5; Inversion= 4-/5 bilaterally  Goal/Measure of Progress Goal Met? 1. Improve FOTO outcome score by 7% to indicate a significant improvement in ADL function   Status at last Eval: 46/100 Current Status: 61/100 yes   2. Pt will verbalize 50% overall improvement in functional ADL's in order to progress towards PLOF. Status at last Eval: Progressing  Current Status: 30% improvement reported no   3. Pt will demonstrate 4+/5 gross BL LE strength in order to improve gait and ambulation.     Status at last Eval: Progressing Current Status: Improved, but Not Met no   4. Patient will report 50% improvement in walking long distance (1/4 mile) to work towards patient desire to return to running. Status at last Eval: Progressing  Current Status: 65% improvement reported yes     New Goals to be achieved in __9__  treatments:  1. Pt will verbalize 50% overall improvement in functional ADL's in order to progress towards PLOF. 2.  Pt will demonstrate 4+/5 BL ankle dorsiflexion/inversion strength in order to improve gait and ambulation. 3.  Pt will report ability to tolerate ascending stairs and inclines without increasing pain level >2/10 for increased function with ADL's.   4.  Pt will have a  running assessment performed as well as trial with treadmill walk/jog intervals before the last 2-3 visits of therapy for goal of return to running activity. RECOMMENDATIONS  Continue with current POC for 9 additional visits at a frequency of 2 times/week with advancing as tolerated, then reassess for the need for continuation or discharge from therapy. If you have any questions/comments please contact us directly at [phone]. Thank you for allowing us to assist in the care of your patient. LPTA Signature: Eddie López PTA  Date: 6/17/2019   PT Signature: Danna Ortega PT, DPT, CMTPT Time: 10:04 AM   NOTE TO PHYSICIAN:  PLEASE COMPLETE THE ORDERS BELOW AND FAX TO   [facility] at [facility] UNIT: [phone]. If you are unable to process this request in 24 hours please contact our office: [phone].    ___ I have read the above report and request that my patient continue as recommended.   ___ I have read the above report and request that my patient continue therapy with the following changes/special instructions:_________________________________________________________   ___ I have read the above report and request that my patient be discharged from therapy.      Physician Signature:        Date: Time:

## 2019-06-19 ENCOUNTER — HOSPITAL ENCOUNTER (OUTPATIENT)
Dept: PHYSICAL THERAPY | Age: 44
Discharge: HOME | End: 2019-06-19
Payer: MEDICARE

## 2019-06-19 PROCEDURE — 97110 THERAPEUTIC EXERCISES: CPT

## 2019-06-19 PROCEDURE — 97140 MANUAL THERAPY 1/> REGIONS: CPT

## 2019-06-19 NOTE — PROGRESS NOTES
PHYSICAL THERAPY - DAILY TREATMENT NOTE    Patient Name: Anabella Cole        Date: 2019  : 1975   yes Patient  Verified  Visit #:     Insurance: Payor: Rosanna Dear / Plan: [place] MEDICARE PART A & B / Product Type: Medicare /      In time: 8:55 Out time: 10:08   Total Treatment Time: 73     Medicare/BCBS Time Tracking (below)   Total Timed Codes (min):  73 1:1 Treatment Time:  35     TREATMENT AREA =  Bilateral leg weakness [R29.898]  Unsteady gait [R26.81]    SUBJECTIVE  Pain Level (on 0 to 10 scale):  1  / 10   Medication Changes/New allergies or changes in medical history, any new surgeries or procedures?    no  If yes, update Summary List   Subjective Functional Status/Changes:  []  No changes reported     Pt reports she has been trying to do the tissue release to her calf herself \"I have bruises from doing it so much\"          OBJECTIVE    61 min Therapeutic Exercise:  [x]  See flow sheet   Rationale:      increase ROM, increase strength, improve balance and increase proprioception to improve the patients ability to ambulate     12 min Manual Therapy: TPR/DTM to the R post tib, medial and lateral gastroc, ant tib   Rationale:      decrease pain, increase ROM, increase tissue extensibility and decrease trigger points to improve patient's ability to stand, transfer, ambulate    Billed With/As:   [x] TE   [] TA   [] Neuro   [] Self Care Patient Education: [x] Review HEP    [] Progressed/Changed HEP based on:   [] positioning   [] body mechanics   [] transfers   [] heat/ice application    [] other:      Other Objective/Functional Measures:    23' 1:1 TE    ttp to the R lateral gastroc  Noted areas of small ecchymosis along medial border of tibial crest  Added resisted ankle DF and IV to improve ankle stability     Post Treatment Pain Level (on 0 to 10) scale:   0.5  / 10     ASSESSMENT  Assessment/Changes in Function:     Pt reporting dec pain post tx session.  Cont to demo dec balance on unstable surfaces w/ inc ankle strategy- will benefit from progression of ankle PREs and increased balance activities to address these findings     []  See Progress Note/Recertification   Patient will continue to benefit from skilled PT services to modify and progress therapeutic interventions, address functional mobility deficits, address ROM deficits, address strength deficits, analyze and address soft tissue restrictions, analyze and cue movement patterns, analyze and modify body mechanics/ergonomics, assess and modify postural abnormalities, address imbalance/dizziness and instruct in home and community integration to attain remaining goals. Progress toward goals / Updated goals:     Added ankle TV to address LTG #2     PLAN  []  Upgrade activities as tolerated yes Continue plan of care   []  Discharge due to :    []  Other:      Therapist: Deb Belle PT    Date: 6/19/2019 Time: 9:00 AM     Future Appointments   Date Time Provider Adriane Sánchez   6/24/2019  9:45 AM Beatrice Healy PTA [facility]   6/26/2019  9:00 AM Acosta Vasques, PT [facility]

## 2019-06-24 ENCOUNTER — HOSPITAL ENCOUNTER (OUTPATIENT)
Dept: PHYSICAL THERAPY | Age: 44
Discharge: HOME | End: 2019-06-24
Payer: MEDICARE

## 2019-06-24 PROCEDURE — 97110 THERAPEUTIC EXERCISES: CPT

## 2019-06-24 PROCEDURE — 97140 MANUAL THERAPY 1/> REGIONS: CPT

## 2019-06-24 NOTE — PROGRESS NOTES
PT DAILY TREATMENT NOTE 8-14    Patient Name: Mahad Ramírez  Date:2019  : 1975  [x]  Patient  Verified  Payor: Yahaira Elaine / Plan: [place] MEDICARE PART A & B / Product Type: Medicare /    In time:9:43  Out time:11:06  Total Treatment Time (min): 83  Total Timed Codes (min): 77  1:1 Treatment Time (min): 45   Visit #: 8 of 12    Treatment Area: Bilateral leg weakness [R29.898]  Unsteady gait [R26.81]    SUBJECTIVE  Pain Level (0-10 scale): 0-1/10  Any medication changes, allergies to medications, adverse drug reactions, diagnosis change, or new procedure performed?: [x] No    [] Yes (see summary sheet for update)  Subjective functional status/changes:   [] No changes reported  My legs have been feeling pretty good lately, my calves even feel like they are loosening up. OBJECTIVE      73 min Therapeutic Exercise:  [x] See flow sheet :   Rationale: increase ROM, increase strength, improve balance and increase proprioception to improve the patients ability to improve functional abilities    10 min Manual Therapy:  Instrument assisted soft tissue mobilization applied to right gastrocnemius using GT-1   Rationale: increase ROM, increase tissue extensibility and decrease trigger points to improve functional mobility        min Patient Education: [x] Review HEP    [] Progressed/Changed HEP based on:   [] positioning   [] body mechanics   [] transfers   [] heat/ice application        Other Objective/Functional Measures:     Pain Level (0-10 scale) post treatment: 0    ASSESSMENT/Changes in Function: Pt was able to tolerate increased resistance with mini band side steps as well as addition of STAR taps of foam and single leg forward leans with moderate challenge today. Pt also reported increased benefit from trigger point release with swiveling with GT-1 to gastrocnemius muscle belly with [facility] today. Will continue to progress/advance patient within current POC as tolerated with monitoring symptoms. Patient will continue to benefit from skilled PT services to modify and progress therapeutic interventions, address strength deficits, analyze and address soft tissue restrictions and analyze and cue movement patterns to attain remaining goals.      []  See Plan of Care  []  See progress note/recertification  []  See Discharge Summary         Progress towards goals / Updated goals:      PLAN  [x]  Upgrade activities as tolerated     []  Continue plan of care  []  Update interventions per flow sheet       []  Discharge due to:_  []  Other:_      Birgit Enriquez, ALISTAIR 6/24/2019  9:43 AM

## 2019-06-26 ENCOUNTER — HOSPITAL ENCOUNTER (OUTPATIENT)
Dept: PHYSICAL THERAPY | Age: 44
Discharge: HOME | End: 2019-06-26
Payer: MEDICARE

## 2019-06-26 PROCEDURE — 97110 THERAPEUTIC EXERCISES: CPT

## 2019-06-26 PROCEDURE — 97140 MANUAL THERAPY 1/> REGIONS: CPT

## 2019-06-26 NOTE — PROGRESS NOTES
PHYSICAL THERAPY - DAILY TREATMENT NOTE    Patient Name: Jesse Cagle        Date: 2019  : 1975   yes Patient  Verified  Visit #:     Insurance: Payor: Flaca Breath / Plan: [place] MEDICARE PART A & B / Product Type: Medicare /      In time: 9:00 Out time: 10:17   Total Treatment Time: 77     Medicare/BCBS Time Tracking (below)   Total Timed Codes (min):  77 1:1 Treatment Time:  45     TREATMENT AREA =  Bilateral leg weakness [R29.898]  Unsteady gait [R26.81]    SUBJECTIVE  Pain Level (on 0 to 10 scale):  1.5  / 10   Medication Changes/New allergies or changes in medical history, any new surgeries or procedures?    no  If yes, update Summary List   Subjective Functional Status/Changes:  []  No changes reported     Pt reports inc tightness today for unknown reason \"I am just having a bad morning today all around\".  Pt reports inc R anterior ankle pain at night when she has a blanket on her feet     OBJECTIVE    62 min Therapeutic Exercise:  [x]  See flow sheet   Rationale:      increase ROM, increase strength, improve coordination, improve balance and increase proprioception to improve the patients ability to ambulate on unlevel surfaces     15 min Manual Therapy: TPR/STM to the R gastroc/soleus, AT CFM, ART to the R lateral gastroc   Rationale:      decrease pain, increase ROM, increase tissue extensibility and decrease trigger points to improve patient's ability to ambulate    Billed With/As:   [x] TE   [] TA   [] Neuro   [] Self Care Patient Education: [x] Review HEP    [] Progressed/Changed HEP based on:   [] positioning   [] body mechanics   [] transfers   [] heat/ice application    [] other:      Other Objective/Functional Measures:    30' 1:1 TE    Pt requiring cueing for eccentric control of therex   Demo improved balance in tandem walking this visit w/ very few instances of stepping strategy  Unable to progress SLR resistance 2' fatigue     Post Treatment Pain Level (on 0 to 10) scale: 1.5  / 10     ASSESSMENT  Assessment/Changes in Function:     Pt reporting no change in pain level post tx session. Attempt progression of resistance at NV as tolerated     []  See Progress Note/Recertification   Patient will continue to benefit from skilled PT services to modify and progress therapeutic interventions, address functional mobility deficits, address ROM deficits, address strength deficits, analyze and address soft tissue restrictions, analyze and cue movement patterns, analyze and modify body mechanics/ergonomics, address imbalance/dizziness and instruct in home and community integration to attain remaining goals. Progress toward goals / Updated goals:    Pain level remaining relatively consistent w/ each treatment session, though noted dec severity overall.  Progress to LTG #1     PLAN  []  Upgrade activities as tolerated yes Continue plan of care   []  Discharge due to :    []  Other:      Therapist: Stan Benavides PT    Date: 6/26/2019 Time: 9:23 AM     Future Appointments   Date Time Provider Adriane Sánchez   7/1/2019  2:15 PM Bruce Argueta PTA [facility]   7/3/2019  8:30 AM Bruce Argueta PTA [facility]   7/8/2019 12:45 PM Bruce Argueta PTA [facility]

## 2019-07-01 ENCOUNTER — HOSPITAL ENCOUNTER (OUTPATIENT)
Dept: PHYSICAL THERAPY | Age: 44
Discharge: HOME | End: 2019-07-01
Payer: MEDICARE

## 2019-07-01 PROCEDURE — 97110 THERAPEUTIC EXERCISES: CPT

## 2019-07-01 PROCEDURE — 97140 MANUAL THERAPY 1/> REGIONS: CPT

## 2019-07-01 NOTE — PROGRESS NOTES
PT DAILY TREATMENT NOTE 8-14    Patient Name: Rommel Durand  Date:2019  : 1975  [x]  Patient  Verified  Payor: VA MEDICARE / Plan: [place] MEDICARE PART A & B / Product Type: Medicare /    In time:2:15  Out time:3:35  Total Treatment Time (min): 80  Total Timed Codes (min): 74  1:1 Treatment Time (min):74    Visit #: 10 of 12    Treatment Area: Bilateral leg weakness [R29.898]  Unsteady gait [R26.81]    SUBJECTIVE  Pain Level (0-10 scale): 0-1/10  Any medication changes, allergies to medications, adverse drug reactions, diagnosis change, or new procedure performed?: [x] No    [] Yes (see summary sheet for update)  Subjective functional status/changes:   [] No changes reported  My legs have been feeling better, there has even been a couple of days where I don't really have any pain, so I think that I am slowly but surely getting better. OBJECTIVE      70 min Therapeutic Exercise:  [x] See flow sheet :   Rationale: increase ROM, increase strength, improve balance and increase proprioception to improve the patients ability to improve functional abilities    10 min Manual Therapy:  Instrument assisted soft tissue mobilization applied to right gastrocnemius using GT-1   Rationale: increase ROM, increase tissue extensibility and decrease trigger points to improve functional mobility         min Patient Education: [x] Review HEP    [] Progressed/Changed HEP based on:   [] positioning   [] body mechanics   [] transfers   [] heat/ice application        Other Objective/Functional Measures:     Pain Level (0-10 scale) post treatment: 0    ASSESSMENT/Changes in Function: Pt was able to tolerated increased resistance  From 1.5 to 2 lbs with 2 way SLR's with min to mod challenge today. Pt also presents with increased functional improvement with decreasing pain intensity/symptoms with standing/walking ADL's over the past few treatment. Will continue to progress/advance patient within current POC as tolerated with monitoring symptoms. Patient will continue to benefit from skilled PT services to modify and progress therapeutic interventions, address functional mobility deficits, address ROM deficits, address strength deficits, analyze and address soft tissue restrictions and analyze and cue movement patterns to attain remaining goals.      []  See Plan of Care  []  See progress note/recertification  []  See Discharge Summary         Progress towards goals / Updated goals:      PLAN  [x]  Upgrade activities as tolerated     []  Continue plan of care  []  Update interventions per flow sheet       []  Discharge due to:_  []  Other:_      Percy Washington, ALISTAIR 7/1/2019  2:19 PM

## 2019-07-03 ENCOUNTER — HOSPITAL ENCOUNTER (OUTPATIENT)
Dept: PHYSICAL THERAPY | Age: 44
Discharge: HOME | End: 2019-07-03
Payer: MEDICARE

## 2019-07-03 PROCEDURE — 97140 MANUAL THERAPY 1/> REGIONS: CPT

## 2019-07-03 PROCEDURE — 97110 THERAPEUTIC EXERCISES: CPT

## 2019-07-03 NOTE — PROGRESS NOTES
PT DAILY TREATMENT NOTE 8    Patient Name: Nataliia Ley  Date:7/3/2019  : 1975  [x]  Patient  Verified  Payor: [place] MED[place]ARE / Plan: [place] MEDICARE PART [place] & B / Product Type: Medicare /    In time:8:31  Out time:10:00  Total Treatment Time (min): 89  Total Timed Codes (min): 83  1:1 Treatment Time (min): 83   Visit #: 11 of 12    Treatment Area: Bilateral leg weakness [R29.898]  Unsteady gait [R26.81]    SUBJECTIVE  Pain Level (0-10 scale): 1/10  Any medication changes, allergies to medications, adverse drug reactions, diagnosis change, or new procedure performed?: [x] No    [] Yes (see summary sheet for update)  Subjective functional status/changes:   [] No changes reported  I did some walk jog intervals yesterday for the first time since before I started therapy and I felt okay afterwards, but I did feel a little bit of soreness this morning in different areas of my legs. OBJECTIVE        77 min Therapeutic Exercise:  [x] See flow sheet :   Rationale: increase ROM, increase strength, improve balance and increase proprioception to improve the patients ability to improve functional abilities    12 min Manual Therapy:  Instrument assisted soft tissue mobilization applied to bilateral shins and anterior ankles using GT-2   Rationale: increase ROM, increase tissue extensibility and decrease trigger points to improve functional mobility         min Patient Education: [x] Review HEP    [] Progressed/Changed HEP based on:   [] positioning   [] body mechanics   [] transfers   [] heat/ice application        Other Objective/Functional Measures:     Pain Level (0-10 scale) post treatment: 1/10    ASSESSMENT/Changes in Function: Pt presented with the most functional improvement with the ability to try light jog/walk intervals for up to 1 mile total since last treatment. Pt although presented with chief c/o mild bilateral shin pain as a result.  Pt was able to advance to soft board #2 with STAR taps with moderate challenge with proprioceptive/balance awareness today. Will continue to progress/advance patient within current POC as tolerated with monitoring symptoms. Patient will continue to benefit from skilled PT services to modify and progress therapeutic interventions, address functional mobility deficits, address ROM deficits, address strength deficits, analyze and address soft tissue restrictions and analyze and cue movement patterns to attain remaining goals. []  See Plan of Care  []  See progress note/recertification  []  See Discharge Summary         Progress towards goals / Updated goals: Will review detailed progress/LTGs for planned discharge next visit.     PLAN  [x]  Upgrade activities as tolerated     []  Continue plan of care  []  Update interventions per flow sheet       []  Discharge due to:_  []  Other:_      Jamil Shannon PTA 7/3/2019  8:48 AM    Future Appointments   Date Time Provider Adriane Sánchez   7/8/2019 12:45 PM Jone Barksdale PTA [facility]

## 2019-07-08 ENCOUNTER — HOSPITAL ENCOUNTER (OUTPATIENT)
Dept: PHYSICAL THERAPY | Age: 44
Discharge: HOME | End: 2019-07-08
Payer: MEDICARE

## 2019-07-08 PROCEDURE — 97140 MANUAL THERAPY 1/> REGIONS: CPT

## 2019-07-08 PROCEDURE — 97110 THERAPEUTIC EXERCISES: CPT

## 2019-07-08 NOTE — PROGRESS NOTES
PT DAILY TREATMENT NOTE     Patient Name: Shameka Florentino  Date:2019  : 1975  [x]  Patient  Verified  Payor: [place] MEDICARE / Plan: [place] MEDICARE PART A & B / Product Type: Medicare /    In time:12:50  Out time:2:12  Total Treatment Time (min): 82  Total Timed Codes (min): 66  1:1 Treatment Time (min): 40   Visit #: 12 of 12    Treatment Area: Bilateral leg weakness [R29.898]  Unsteady gait [R26.81]    SUBJECTIVE  Pain Level (0-10 scale): 1/10  Any medication changes, allergies to medications, adverse drug reactions, diagnosis change, or new procedure performed?: [x] No    [] Yes (see summary sheet for update)  Subjective functional status/changes:   [] No changes reported  I ran the day after I was here and it kind of flared up the original spot in my right calf, so it has been hurting worse since I was here last.    OBJECTIVE    72 min Therapeutic Exercise:  [x] See flow sheet :   Rationale: increase ROM, increase strength, improve balance and increase proprioception to improve the patients ability to improve functional abilities    10 min Manual Therapy:  Instrument assisted soft tissue mobilization applied to right gastrocnemius using GT-1   Rationale: increase ROM, increase tissue extensibility and decrease trigger points to improve functional mobility         min Patient Education: [x] Review HEP    [] Progressed/Changed HEP based on:   [] positioning   [] body mechanics   [] transfers   [] heat/ice application        Other Objective/Functional Measures:     Pain Level (0-10 scale) post treatment: 1/10    ASSESSMENT/Changes in Function:      []  See Plan of Care  [x]  See progress note/recertification  []  See Discharge Summary         Progress towards goals / Updated goals:  See discharge summary for full final detailed progress towards all established goals.      PLAN  []  Upgrade activities as tolerated     []  Continue plan of care  []  Update interventions per flow sheet       [x] Discharge due to:Patient has achieved maximum medical benefit/potential from current POC after completing 12 of 12 prescribed visits and is ready for discharge from therapy at this time.   []  Other:_      Camilo Cast, ALISTAIR 7/8/2019  11:13 AM

## 2019-07-08 NOTE — PROGRESS NOTES
[facility] AT [address], [address] [place] [address] Road  Phone: [phone]   [phone]  DISCHARGE SUMMARY  Patient Name: Meryl Pinedo : 1975   Treatment/Medical Diagnosis: Bilateral leg weakness [R29.898]  Unsteady gait [R26.81]   Referral Source: Alexandria Espinoza MD     Date of Initial Visit: 19 Attended Visits: 12 Missed Visits: 1     SUMMARY OF TREATMENT  Therapeutic exercise for bilateral LE strengthening and biomechanical symmetry, proprioceptive/balance awareness, manual intervention and HEP. CURRENT STATUS  Patient reports approximately 30% overall improvement from therapy since initial evaluation with 3/10 pain level on average, increased to 4/10 at the worst with attempting to resume light running activity for up to 1 mile with walk/jog intervals as well as with ascending stairs and inclines with hiking. Pt has made slow but steady progress with advancing with bilateral LE strengthening and balance/proprioceptive awareness with current POC. Patient has achieved maximum medical benefit/potential from current POC after completing 12 of 12 prescribed visits and is ready for discharge from therapy at this time. Pt was given and instructed in an updated HEP for continued self maintenance of reduced symptoms after D/C from therapy. Ankle strength measurements/MMT= DF=right=5/5, left=4/5; Inversion= right=4+/5, left=4-/5  Goal/Measure of Progress Goal Met? 1. Pt will verbalize 50% overall improvement in functional ADL's in order to progress towards PLOF. Status at last Eval: 30% improvement reported Current Status: 30% impovement reported yes   2. Pt will demonstrate 4+/5 BL ankle dorsiflexion/inversion strength in order to improve gait and ambulation. Status at last Eval: New goal established last assessment Current Status: Not Met no   3.   Pt will report ability to tolerate ascending stairs and inclines without increasing pain level >2/10 for increased function with ADL's. Status at last Eval: New goal established last assessment Current Status: Not Met no   4. Pt will have a running assessment performed as well as trial with treadmill walk/jog intervals before the last 2-3 visits of therapy for goal of return to running activity. Status at last Eval: New goal established last assessment Current Status: Not Met, Pt did not bring appropriate footwear for running today and declined running assessment no     RECOMMENDATIONS:  Patient has achieved maximum medical benefit/potential from current POC after completing 12 of 12 prescribed visits and is ready for discharge from therapy at this time. If you have any questions/comments please contact us directly at [phone]. Thank you for allowing us to assist in the care of your patient. LPTA Signature: Mikayla Lyle PTA Date: 7/8/19   Therapist Signature: Thomas Kaur PT, DPT, CMTPT Time: 11:17 AM     NOTE TO PHYSICIAN:  Your patient's insurance requires this discharge note be signed and returned. PLEASE COMPLETE THE ORDERS BELOW AND RETURN TO:  [facility] @ [phone]    ___ I have read the above report and request that my patient be discharged from therapy.      Physician Signature:        Date:       Time:

## 2024-01-04 ENCOUNTER — TELEMEDICINE (OUTPATIENT)
Dept: NEUROLOGY | Facility: HOSPITAL | Age: 49
End: 2024-01-04
Payer: MEDICARE

## 2024-01-04 DIAGNOSIS — G90.8 AUTOIMMUNE AUTONOMIC NEUROPATHY: Primary | ICD-10-CM

## 2024-01-04 PROCEDURE — 99214 OFFICE O/P EST MOD 30 MIN: CPT | Mod: 95 | Performed by: PSYCHIATRY & NEUROLOGY

## 2024-01-04 PROCEDURE — 99214 OFFICE O/P EST MOD 30 MIN: CPT | Performed by: PSYCHIATRY & NEUROLOGY

## 2024-01-04 NOTE — PROGRESS NOTES
I am seeing Jenn Gonzalez today for follow-up visit, regarding her response to IVIG and her tolerance of this treatment.  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  She has been doing very well on this treatment and she is tolerating the treatments well at the current regimen and frequency.  The patient does not have any concerns.    We will therefore renew IVIG treatments for another year and I will have a follow-up with her at that time.  She gets her infusions at the infusion center at [facility].  They will draw a yearly CBC and creatinine.    Yohan Jones MD

## 2024-01-04 NOTE — PATIENT INSTRUCTIONS
I am seeing Jenn Gonzalez today for follow-up visit, regarding her response to IVIG and her tolerance of this treatment.  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  She has been doing very well on this treatment and she is tolerating the treatments well at the current regimen and frequency.  The patient does not have any concerns.    We will therefore renew IVIG treatments for another year and I will have a follow-up with her at that time.  She gets her infusions at the infusion center at [facility].  They will draw a yearly CBC and creatinine.    Yohan Jones MD

## 2024-04-04 ENCOUNTER — APPOINTMENT (OUTPATIENT)
Dept: NEUROLOGY | Facility: HOSPITAL | Age: 49
End: 2024-04-04
Payer: MEDICARE

## 2025-01-02 ENCOUNTER — TELEMEDICINE (OUTPATIENT)
Dept: NEUROLOGY | Facility: HOSPITAL | Age: 50
End: 2025-01-02
Payer: MEDICARE

## 2025-01-02 DIAGNOSIS — G90.89 AUTOIMMUNE AUTONOMIC NEUROPATHY: Primary | ICD-10-CM

## 2025-01-02 PROCEDURE — 99214 OFFICE O/P EST MOD 30 MIN: CPT | Mod: 95 | Performed by: PSYCHIATRY & NEUROLOGY

## 2025-01-02 PROCEDURE — 99214 OFFICE O/P EST MOD 30 MIN: CPT | Performed by: PSYCHIATRY & NEUROLOGY

## 2025-01-02 NOTE — PROGRESS NOTES
"[facility] AUTONOMIC PROGRAM    AUTONOMIC FOLLOW-UP VISIT      Yohan Jones MD  Professor of Neurology  [facility]  Senior Attending Physician- [facility]  Director, Autonomic Program  Medical Director, [facility] [employer] [place] [address]  Office: [phone]  Assistant: Tawana Robert (email: [email or web address])     Patient Information     Medical Record Number: [id number]   YOB: 1975    Home Address: [address] [place] [id number]   Phone Number:  [phone]      Primary Care Physician: No primary care provider on file.    Referring Physician: No referring provider defined for this encounter.    Patient accompanied by: n/a  In addition to attending physician, patient seen by: NIMISHA Coto      Clinical Scores    CLINICAL SCORES  OFAS: 70% : You can make social and family commitments most of the time.    IMPRESSION:  Jenn Gonzalez is a 49 year old woman with a history of a severe autoimmune autonomic neuropathy. She was diagnosed with POTS and small fiber neuropathy in 2014. She was found to have Sjogren's and Hashimoto with worsening autonomic symptoms in 2019. Her repeat EMG in 2019 showed progression to large fiber involvement. She was started on IVIG at 1 gram/kg/dose every 4 weeks which significantly improved her symptoms. Repeat EMG in 2021 showed improvement in her peripheral neuropathy after being treated with IVIG. She also sees rheumatology for additional autoimmune management.     Status of the symptoms:  States that she has been getting the IVIG every 4 weeks, but did have a period this past year where she went a few months without an infusion. She states that she was doing well until about month 3 when she noticed worsening symptoms with her hands and feet \"locking up\" and severe muscle pain. These symptoms have gone away since being " "back on her regular infusion schedule. She has her next infusion scheduled next week and has been feeling well. 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     PLAN/RECOMMENDATIONS:   -Patient set to receive her next infusion in a week. We will have her get this infusion and then space out her IVIG to every 6 weeks.  -Follow-up in 6 months or sooner if she notices any worsening of symptoms in between infusions. If she is tolerating every 6 weeks, the plan would be to further space out the infusions to every 8 weeks at the next appointment.   -CBC and Cr yearly     HPI    Jenn Gonzalez is a 49 y.o. y/o  female presenting to the  Autonomic Program for follow-up on crruent IVIG plan for autoimmune autonomic neuropathy. She has a previous history of anxiety, depression, Behcet's, chronic pain, migraine head ache, small and large fiber neuropathy, Sjogren's, and Hashimoto.     Interval History details   She had a period of time this year where she was without her IVIG treatment for a few months, and noted a return of symptoms around month 3 which were much worse then before, and she had severe muscle cramping and \"locking up\" of her muscles. She is currently having work-up done by GI for rising liver enzymes.     Current Medication list:  Recently stopped Anakinra.   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 Confirmed Autonomic testing of POTS and small fiber neuropathy. Repeat EMG in 2019 showed large fiber involvement. Diagnosed with Sjogren's and Hashimoto in 2019 suggesting a severe autoimmune autonomic neuropathy and was started on IVIG 1 gram/kg/dose. Additionally " she sees rheumatology who manages prednisone and Imuran. She has been doing well on this current regimen. Repeat EMG in 2021 showed improvement in peripheral neuropathy while on current IVIG treatment.       I spent 30 minutes with the patient, at least 50% of which were spent on treatment management and education.    Pat Andres, APRN-CNP    Yohan Jones MD

## 2025-01-09 ENCOUNTER — APPOINTMENT (OUTPATIENT)
Dept: NEUROLOGY | Facility: HOSPITAL | Age: 50
End: 2025-01-09
Payer: MEDICARE